# Patient Record
Sex: FEMALE | Race: WHITE | NOT HISPANIC OR LATINO | Employment: FULL TIME | URBAN - METROPOLITAN AREA
[De-identification: names, ages, dates, MRNs, and addresses within clinical notes are randomized per-mention and may not be internally consistent; named-entity substitution may affect disease eponyms.]

---

## 2019-02-13 LAB — BCS RECOMMENDATION EXT: NORMAL

## 2019-04-18 ENCOUNTER — OFFICE VISIT (OUTPATIENT)
Dept: OBSTETRICS AND GYNECOLOGY | Facility: CLINIC | Age: 59
End: 2019-04-18
Payer: COMMERCIAL

## 2019-04-18 VITALS
SYSTOLIC BLOOD PRESSURE: 124 MMHG | BODY MASS INDEX: 30.83 KG/M2 | DIASTOLIC BLOOD PRESSURE: 78 MMHG | HEIGHT: 62 IN | WEIGHT: 167.56 LBS

## 2019-04-18 DIAGNOSIS — D25.9 UTERINE LEIOMYOMA, UNSPECIFIED LOCATION: Primary | ICD-10-CM

## 2019-04-18 DIAGNOSIS — I10 HYPERTENSION, UNSPECIFIED TYPE: ICD-10-CM

## 2019-04-18 PROCEDURE — 3078F DIAST BP <80 MM HG: CPT | Mod: CPTII,S$GLB,, | Performed by: OBSTETRICS & GYNECOLOGY

## 2019-04-18 PROCEDURE — 3074F SYST BP LT 130 MM HG: CPT | Mod: CPTII,S$GLB,, | Performed by: OBSTETRICS & GYNECOLOGY

## 2019-04-18 PROCEDURE — 3074F PR MOST RECENT SYSTOLIC BLOOD PRESSURE < 130 MM HG: ICD-10-PCS | Mod: CPTII,S$GLB,, | Performed by: OBSTETRICS & GYNECOLOGY

## 2019-04-18 PROCEDURE — 3008F BODY MASS INDEX DOCD: CPT | Mod: CPTII,S$GLB,, | Performed by: OBSTETRICS & GYNECOLOGY

## 2019-04-18 PROCEDURE — 99999 PR PBB SHADOW E&M-NEW PATIENT-LVL III: ICD-10-PCS | Mod: PBBFAC,,, | Performed by: OBSTETRICS & GYNECOLOGY

## 2019-04-18 PROCEDURE — 99203 PR OFFICE/OUTPT VISIT, NEW, LEVL III, 30-44 MIN: ICD-10-PCS | Mod: S$GLB,,, | Performed by: OBSTETRICS & GYNECOLOGY

## 2019-04-18 PROCEDURE — 3078F PR MOST RECENT DIASTOLIC BLOOD PRESSURE < 80 MM HG: ICD-10-PCS | Mod: CPTII,S$GLB,, | Performed by: OBSTETRICS & GYNECOLOGY

## 2019-04-18 PROCEDURE — 3008F PR BODY MASS INDEX (BMI) DOCUMENTED: ICD-10-PCS | Mod: CPTII,S$GLB,, | Performed by: OBSTETRICS & GYNECOLOGY

## 2019-04-18 PROCEDURE — 99999 PR PBB SHADOW E&M-NEW PATIENT-LVL III: CPT | Mod: PBBFAC,,, | Performed by: OBSTETRICS & GYNECOLOGY

## 2019-04-18 PROCEDURE — 99203 OFFICE O/P NEW LOW 30 MIN: CPT | Mod: S$GLB,,, | Performed by: OBSTETRICS & GYNECOLOGY

## 2019-04-18 RX ORDER — PROGESTERONE 200 MG/1
CAPSULE ORAL
Refills: 1 | Status: ON HOLD | COMMUNITY
Start: 2019-04-08 | End: 2019-05-07

## 2019-04-18 NOTE — PROGRESS NOTES
Subjective:       Patient ID: Ivana Masters is a 58 y.o. female.    Chief Complaint:  Vaginal Bleeding (Patient reports vaginal bleeding started after Pellet insertion 2019. Patient is currently taking Prometrium 200mg.)    History of Present Illness:  HPI  59 y/o  presents for evaluation of PMB and uterine fibroids. She is menopausal for the past 2 years. She had endometrial ablation about 12 years ago which did not decrease her bleeding after the first month. Prior to ablation, cycles were heavy, lasting for 10 days. She is seen at [facility] for GYN care (Sury Cardona, [place]). She had pellets placed 2/15/19 for HRT but started having vaginal bleeding so was started on prometrium 200 mg daily. Recent pap and MMG normal in 2019. She brings her report from [facility] of a recent pelvic U/S. She is interested in definitive surgical management with hysterectomy.    4/15/19  Transvaginal U/S    Findings: The uterus appears to be anteverted with the uterus being enlarged measuring 11.8 x 6.9 x 8.6 cm. Enlargement is due to multiple hypoechoic solid masses most consistent with benign uterine fibroids with 3 moderated sized approximately 4.5 to 5 cm separate lesions seen. There are two anterior fibroids measuring 4.7 cm log axis with transmural involvement including submucosal involvement and there is a left-sided posterior fibroid measuring approximately 5.1 cm long axis transmural. Suspected benign uterine fibroids present demonstrate heterogeneous increased echogenicity and there is also some shadowing from the posterior lesion consistent with calcification and with changes consistent with fibroid degeneration. There is distortion of uterine anatomy with somewhat limited endometrial stripe visualization and with no definite pathological endometrial stripe thickening or focal lesion detected and with the endometrium measuring approximately 8 mm in thickness. Both ovaries appear within normal limits. No  pelvic free fluid was seen. No adnexal tenderness was reported. The bladder was not evaluated.    GYN & OB History  Patient's last menstrual period was 2014.   Date of Last Pap: No result found    OB History    Para Term  AB Living   2 2           SAB TAB Ectopic Multiple Live Births                  # Outcome Date GA Lbr Nir/2nd Weight Sex Delivery Anes PTL Lv   2 Para      Vag-Spont      1 Para      Vag-Spont          Review of Systems  Review of Systems   Constitutional: Negative for chills, diaphoresis, fatigue and fever.   Respiratory: Negative for cough and shortness of breath.    Cardiovascular: Negative for chest pain and palpitations.   Gastrointestinal: Negative for abdominal pain, constipation, diarrhea, nausea and vomiting.   Endocrine: Negative for hot flashes, hyperthyroidism and hypothyroidism.   Genitourinary: Positive for menorrhagia, menstrual problem and vaginal bleeding. Negative for dysmenorrhea, dyspareunia, dysuria, frequency, pelvic pain, vaginal discharge and vaginal pain.   Musculoskeletal: Negative for back pain and myalgias.   Integumentary:  Negative for rash and acne.   Neurological: Negative for headaches.   Psychiatric/Behavioral: Negative for depression. The patient is not nervous/anxious.            Objective:    Physical Exam:   Constitutional: She is oriented to person, place, and time. She appears well-developed and well-nourished. No distress.    HENT:   Head: Normocephalic and atraumatic.    Eyes: EOM are normal.    Neck: Normal range of motion.     Pulmonary/Chest: Effort normal.          Genitourinary:   Genitourinary Comments: Defer to next visit.           Musculoskeletal: Normal range of motion and moves all extremeties.       Neurological: She is alert and oriented to person, place, and time.    Skin: Skin is warm. No rash noted.    Psychiatric: She has a normal mood and affect. Her behavior is normal. Judgment and thought content normal.           Assessment:        1. Uterine leiomyoma, unspecified location    2. Hypertension, unspecified type             Plan:      Ivana was seen today for vaginal bleeding.    Diagnoses and all orders for this visit:    Uterine leiomyoma, unspecified location  - Reviewed U/S report noting 11 cm uterus with 3 distinct 4.5-5 cm fibroids.   - Counseled on need for tissue specimen given AUB. Will schedule EMBx next week. May be insufficient given history of endometrial ablation.  - Patient desires definitive surgical management with hysterectomy. If EMBx benign, will proceed with scheduling.   - Continue prometrium for now.   - Records requested from her GYN office. Per patient she has had some recent labs including thyroid testing.   - Referral placed for PCP establishment as she has only seen GYN in recent years. History of HTN but controlled after weight loss.    Orders Placed This Encounter   Procedures    Ambulatory referral to Family Practice       Follow up in about 1 week (around 4/25/2019) for EMBx.

## 2019-04-22 ENCOUNTER — PROCEDURE VISIT (OUTPATIENT)
Dept: OBSTETRICS AND GYNECOLOGY | Facility: CLINIC | Age: 59
End: 2019-04-22
Payer: COMMERCIAL

## 2019-04-22 VITALS — HEIGHT: 62 IN | BODY MASS INDEX: 30.69 KG/M2 | WEIGHT: 166.75 LBS

## 2019-04-22 DIAGNOSIS — N95.0 POSTMENOPAUSAL BLEEDING: Primary | ICD-10-CM

## 2019-04-22 PROCEDURE — 58100 BIOPSY OF UTERUS LINING: CPT | Mod: S$GLB,,, | Performed by: OBSTETRICS & GYNECOLOGY

## 2019-04-22 PROCEDURE — 88305 TISSUE EXAM BY PATHOLOGIST: CPT | Mod: 26,,, | Performed by: PATHOLOGY

## 2019-04-22 PROCEDURE — 88305 TISSUE EXAM BY PATHOLOGIST: CPT | Performed by: PATHOLOGY

## 2019-04-22 PROCEDURE — 58100 BIOPSY (GYNECOLOGICAL): ICD-10-PCS | Mod: S$GLB,,, | Performed by: OBSTETRICS & GYNECOLOGY

## 2019-04-22 PROCEDURE — 88305 TISSUE SPECIMEN TO PATHOLOGY, OBSTETRICS/GYNECOLOGY: ICD-10-PCS | Mod: 26,,, | Performed by: PATHOLOGY

## 2019-04-29 ENCOUNTER — OFFICE VISIT (OUTPATIENT)
Dept: PRIMARY CARE CLINIC | Facility: CLINIC | Age: 59
End: 2019-04-29
Payer: COMMERCIAL

## 2019-04-29 VITALS
RESPIRATION RATE: 16 BRPM | HEIGHT: 62 IN | OXYGEN SATURATION: 99 % | HEART RATE: 55 BPM | SYSTOLIC BLOOD PRESSURE: 126 MMHG | WEIGHT: 169 LBS | BODY MASS INDEX: 31.1 KG/M2 | DIASTOLIC BLOOD PRESSURE: 78 MMHG | TEMPERATURE: 98 F

## 2019-04-29 DIAGNOSIS — Z13.6 ENCOUNTER FOR SCREENING FOR CARDIOVASCULAR DISORDERS: ICD-10-CM

## 2019-04-29 DIAGNOSIS — Z12.11 COLON CANCER SCREENING: ICD-10-CM

## 2019-04-29 DIAGNOSIS — N93.8 DUB (DYSFUNCTIONAL UTERINE BLEEDING): Primary | ICD-10-CM

## 2019-04-29 DIAGNOSIS — Z01.818 PREOPERATIVE EXAMINATION: ICD-10-CM

## 2019-04-29 DIAGNOSIS — R73.9 HYPERGLYCEMIA: ICD-10-CM

## 2019-04-29 PROCEDURE — 93010 EKG 12-LEAD: ICD-10-PCS | Mod: S$GLB,,, | Performed by: INTERNAL MEDICINE

## 2019-04-29 PROCEDURE — 99204 OFFICE O/P NEW MOD 45 MIN: CPT | Mod: S$GLB,,, | Performed by: FAMILY MEDICINE

## 2019-04-29 PROCEDURE — 3008F PR BODY MASS INDEX (BMI) DOCUMENTED: ICD-10-PCS | Mod: CPTII,S$GLB,, | Performed by: FAMILY MEDICINE

## 2019-04-29 PROCEDURE — 93005 EKG 12-LEAD: ICD-10-PCS | Mod: S$GLB,,, | Performed by: FAMILY MEDICINE

## 2019-04-29 PROCEDURE — 99999 PR PBB SHADOW E&M-EST. PATIENT-LVL IV: CPT | Mod: PBBFAC,,, | Performed by: FAMILY MEDICINE

## 2019-04-29 PROCEDURE — 3074F SYST BP LT 130 MM HG: CPT | Mod: CPTII,S$GLB,, | Performed by: FAMILY MEDICINE

## 2019-04-29 PROCEDURE — 93005 ELECTROCARDIOGRAM TRACING: CPT | Mod: S$GLB,,, | Performed by: FAMILY MEDICINE

## 2019-04-29 PROCEDURE — 99999 PR PBB SHADOW E&M-EST. PATIENT-LVL IV: ICD-10-PCS | Mod: PBBFAC,,, | Performed by: FAMILY MEDICINE

## 2019-04-29 PROCEDURE — 3078F PR MOST RECENT DIASTOLIC BLOOD PRESSURE < 80 MM HG: ICD-10-PCS | Mod: CPTII,S$GLB,, | Performed by: FAMILY MEDICINE

## 2019-04-29 PROCEDURE — 3008F BODY MASS INDEX DOCD: CPT | Mod: CPTII,S$GLB,, | Performed by: FAMILY MEDICINE

## 2019-04-29 PROCEDURE — 3078F DIAST BP <80 MM HG: CPT | Mod: CPTII,S$GLB,, | Performed by: FAMILY MEDICINE

## 2019-04-29 PROCEDURE — 3074F PR MOST RECENT SYSTOLIC BLOOD PRESSURE < 130 MM HG: ICD-10-PCS | Mod: CPTII,S$GLB,, | Performed by: FAMILY MEDICINE

## 2019-04-29 PROCEDURE — 99204 PR OFFICE/OUTPT VISIT, NEW, LEVL IV, 45-59 MIN: ICD-10-PCS | Mod: S$GLB,,, | Performed by: FAMILY MEDICINE

## 2019-04-29 PROCEDURE — 93010 ELECTROCARDIOGRAM REPORT: CPT | Mod: S$GLB,,, | Performed by: INTERNAL MEDICINE

## 2019-04-29 NOTE — PROGRESS NOTES
"Subjective:       Patient ID: Ivana Masters is a 58 y.o. female.    Chief Complaint: Establish Care and Pre-op Exam (Patient is having a hysterectomy soon but needs to be cleared prior )    Has been having issues with significant postmenopausal uterine bleeding, recent biopsy negative.  Being scheduled for hysterectomy, needs surgical clearance.  No previous surgical complications.  No recent illness or injury other than gyn issues.  Has been told in the past that her blood sugar was slightly elevated, has tried to decrease her carbohydrate intake as a result of this.    Review of Systems   Constitutional: Negative for activity change and unexpected weight change.   HENT: Negative for hearing loss, rhinorrhea and trouble swallowing.    Eyes: Negative for discharge and visual disturbance.   Respiratory: Negative for chest tightness and wheezing.    Cardiovascular: Negative for chest pain and palpitations.   Gastrointestinal: Positive for constipation. Negative for abdominal pain, blood in stool, diarrhea and vomiting.   Endocrine: Negative for polydipsia and polyuria.   Genitourinary: Positive for menstrual problem. Negative for difficulty urinating, dysuria and hematuria.   Musculoskeletal: Positive for arthralgias and neck pain. Negative for joint swelling.   Skin: Negative for pallor, rash and wound.   Allergic/Immunologic: Negative for immunocompromised state.   Neurological: Negative for weakness and headaches.   Hematological: Does not bruise/bleed easily.   Psychiatric/Behavioral: Negative for confusion and dysphoric mood.       Objective:      Vitals:    04/29/19 1201   BP: 126/78   BP Location: Left arm   Patient Position: Sitting   BP Method: Large (Automatic)   Pulse: (!) 55   Resp: 16   Temp: 97.9 °F (36.6 °C)   TempSrc: Oral   SpO2: 99%   Weight: 76.7 kg (169 lb)   Height: 5' 2" (1.575 m)     Lab Results   Component Value Date    WBC 8.30 04/29/2019    HGB 13.0 04/29/2019    HCT 39.2 04/29/2019    PLT " 222 04/29/2019    CHOL 285 (H) 04/29/2019    TRIG 77 04/29/2019    HDL 71 04/29/2019    ALT 27 04/29/2019    AST 25 04/29/2019     04/29/2019    K 3.8 04/29/2019     04/29/2019    CREATININE 0.5 04/29/2019    BUN 13 04/29/2019    CO2 25 04/29/2019    TSH 1.79 04/29/2019     Physical Exam   Constitutional: She is oriented to person, place, and time. She appears well-developed and well-nourished.   HENT:   Head: Normocephalic and atraumatic.   Eyes: Pupils are equal, round, and reactive to light. EOM are normal.   Neck: Neck supple. No JVD present. Carotid bruit is not present.   Cardiovascular: Normal rate, regular rhythm and normal heart sounds.   Pulses:       Radial pulses are 2+ on the right side, and 2+ on the left side.   Pulmonary/Chest: Effort normal and breath sounds normal.   Abdominal: Soft. Bowel sounds are normal. She exhibits no distension. There is no tenderness.   Musculoskeletal: She exhibits no edema.   Neurological: She is alert and oriented to person, place, and time.   Skin: Skin is warm and dry.   Psychiatric: She has a normal mood and affect. Her behavior is normal.   Nursing note and vitals reviewed.      Assessment:       1. DUB (dysfunctional uterine bleeding)    2. Hyperglycemia    3. Preoperative examination    4. Encounter for screening for cardiovascular disorders    5. Colon cancer screening        Plan:       DUB (dysfunctional uterine bleeding)  -     TSH; Future; Expected date: 04/29/2019    Hyperglycemia  -     Comprehensive metabolic panel; Future; Expected date: 04/29/2019  -     Hemoglobin A1c; Future; Expected date: 04/29/2019  Low carb diet, exercise  Preoperative examination  -     EKG 12-lead  -     CBC auto differential; Future; Expected date: 04/29/2019  Cleared for surgery under general anesthesia  Encounter for screening for cardiovascular disorders  -     EKG 12-lead  -     CBC auto differential; Future; Expected date: 04/29/2019  -     Comprehensive  metabolic panel; Future; Expected date: 04/29/2019  -     Lipid panel; Future; Expected date: 04/29/2019    Colon cancer screening  -     Ambulatory referral to Colorectal Surgery      Medication List with Changes/Refills   Current Medications    PROGESTERONE (PROMETRIUM) 200 MG CAPSULE    TK ONE C PO  QHS

## 2019-04-30 ENCOUNTER — PATIENT MESSAGE (OUTPATIENT)
Dept: OBSTETRICS AND GYNECOLOGY | Facility: CLINIC | Age: 59
End: 2019-04-30

## 2019-04-30 ENCOUNTER — TELEPHONE (OUTPATIENT)
Dept: OBSTETRICS AND GYNECOLOGY | Facility: CLINIC | Age: 59
End: 2019-04-30

## 2019-04-30 DIAGNOSIS — N95.0 POSTMENOPAUSAL BLEEDING: Primary | ICD-10-CM

## 2019-05-02 ENCOUNTER — ANESTHESIA EVENT (OUTPATIENT)
Dept: SURGERY | Facility: OTHER | Age: 59
End: 2019-05-02
Payer: COMMERCIAL

## 2019-05-02 ENCOUNTER — HOSPITAL ENCOUNTER (OUTPATIENT)
Dept: PREADMISSION TESTING | Facility: OTHER | Age: 59
Discharge: HOME | End: 2019-05-02
Attending: OBSTETRICS & GYNECOLOGY
Payer: COMMERCIAL

## 2019-05-02 ENCOUNTER — OFFICE VISIT (OUTPATIENT)
Dept: OBSTETRICS AND GYNECOLOGY | Facility: CLINIC | Age: 59
End: 2019-05-02
Payer: COMMERCIAL

## 2019-05-02 VITALS
RESPIRATION RATE: 16 BRPM | HEART RATE: 75 BPM | DIASTOLIC BLOOD PRESSURE: 80 MMHG | WEIGHT: 166 LBS | BODY MASS INDEX: 30.55 KG/M2 | SYSTOLIC BLOOD PRESSURE: 125 MMHG | HEIGHT: 62 IN | TEMPERATURE: 98 F | OXYGEN SATURATION: 97 %

## 2019-05-02 DIAGNOSIS — N95.0 POSTMENOPAUSAL BLEEDING: Primary | ICD-10-CM

## 2019-05-02 DIAGNOSIS — N93.8 DUB (DYSFUNCTIONAL UTERINE BLEEDING): Primary | ICD-10-CM

## 2019-05-02 LAB
ABO + RH BLD: NORMAL
BLD GP AB SCN CELLS X3 SERPL QL: NORMAL

## 2019-05-02 PROCEDURE — 99999 PR PBB SHADOW E&M-EST. PATIENT-LVL II: ICD-10-PCS | Mod: PBBFAC,,, | Performed by: OBSTETRICS & GYNECOLOGY

## 2019-05-02 PROCEDURE — 36415 COLL VENOUS BLD VENIPUNCTURE: CPT

## 2019-05-02 PROCEDURE — 99499 UNLISTED E&M SERVICE: CPT | Mod: S$GLB,,, | Performed by: OBSTETRICS & GYNECOLOGY

## 2019-05-02 PROCEDURE — 99499 NO LOS: ICD-10-PCS | Mod: S$GLB,,, | Performed by: OBSTETRICS & GYNECOLOGY

## 2019-05-02 PROCEDURE — 86901 BLOOD TYPING SEROLOGIC RH(D): CPT

## 2019-05-02 PROCEDURE — 99999 PR PBB SHADOW E&M-EST. PATIENT-LVL II: CPT | Mod: PBBFAC,,, | Performed by: OBSTETRICS & GYNECOLOGY

## 2019-05-02 RX ORDER — LIDOCAINE HYDROCHLORIDE 10 MG/ML
0.5 INJECTION, SOLUTION EPIDURAL; INFILTRATION; INTRACAUDAL; PERINEURAL ONCE
Status: CANCELLED | OUTPATIENT
Start: 2019-05-02 | End: 2019-05-02

## 2019-05-02 RX ORDER — SODIUM CHLORIDE, SODIUM LACTATE, POTASSIUM CHLORIDE, CALCIUM CHLORIDE 600; 310; 30; 20 MG/100ML; MG/100ML; MG/100ML; MG/100ML
INJECTION, SOLUTION INTRAVENOUS CONTINUOUS
Status: CANCELLED | OUTPATIENT
Start: 2019-05-02

## 2019-05-02 RX ORDER — OMEPRAZOLE 10 MG/1
10 CAPSULE, DELAYED RELEASE ORAL DAILY
COMMUNITY

## 2019-05-02 RX ORDER — ACETAMINOPHEN 500 MG
1000 TABLET ORAL
Status: CANCELLED | OUTPATIENT
Start: 2019-05-02 | End: 2019-05-02

## 2019-05-02 RX ORDER — FAMOTIDINE 20 MG/1
20 TABLET, FILM COATED ORAL
Status: CANCELLED | OUTPATIENT
Start: 2019-05-02 | End: 2019-05-02

## 2019-05-02 RX ORDER — PREGABALIN 75 MG/1
150 CAPSULE ORAL ONCE
Status: CANCELLED | OUTPATIENT
Start: 2019-05-02 | End: 2019-05-02

## 2019-05-02 NOTE — ANESTHESIA PREPROCEDURE EVALUATION
05/02/2019  Ivana Masters is a 58 y.o., female.    Anesthesia Evaluation    I have reviewed the Patient Summary Reports.    I have reviewed the Nursing Notes.   I have reviewed the Medications.     Review of Systems  Anesthesia Hx:  Denies Family Hx of Anesthesia complications.   Denies Personal Hx of Anesthesia complications.   Social:  Non-Smoker    Hematology/Oncology:  Hematology Normal   Oncology Normal    -- Denies Anemia (hct 39):    EENT/Dental:EENT/Dental Normal   Cardiovascular:   Hypertension    Pulmonary:  Pulmonary Normal    Renal/:  Renal/ Normal     Hepatic/GI:   GERD (OTC rx), well controlled Gastric sleeve   Musculoskeletal:  Musculoskeletal Normal    Neurological:  Neurology Normal    Endocrine:  Endocrine Normal    Dermatological:  Skin Normal    Psych:  Psychiatric Normal           Physical Exam  General:  Well nourished    Airway/Jaw/Neck:  Airway Findings: Mouth Opening: Normal Mallampati: II  TM Distance: Normal, at least 6 cm  Jaw/Neck Findings:  Neck ROM: Normal ROM      Dental:  Dental Findings: In tact, Molar caps        Mental Status:  Mental Status Findings:  Cooperative, Alert and Oriented         Anesthesia Plan  Type of Anesthesia, risks & benefits discussed:  Anesthesia Type:  general  Patient's Preference:   Intra-op Monitoring Plan: standard ASA monitors  Intra-op Monitoring Plan Comments:   Post Op Pain Control Plan: multimodal analgesia  Post Op Pain Control Plan Comments:   Induction:   IV  Beta Blocker:         Informed Consent: Patient understands risks and agrees with Anesthesia plan.  Questions answered. Anesthesia consent signed with patient.  ASA Score: 2     Day of Surgery Review of History & Physical:    H&P update referred to the surgeon.     Anesthesia Plan Notes: Recent labs/EKG/clearance in epic, reviewed, OK, T&S today        Ready For Surgery From  Anesthesia Perspective.

## 2019-05-02 NOTE — PROGRESS NOTES
History & Physical  Gynecology      SUBJECTIVE:     Chief Complaint: Vaginal Bleeding     History of Present Illness:  57 y/o  presents for surgery workup for scheduled TLH/BSO on 19 for postmenopausal bleeding. EMBx negative. She is menopausal for the past 2 years. She had endometrial ablation about 12 years ago which did not decrease her bleeding after the first month. Prior to ablation, cycles were heavy, lasting for 10 days. She is seen at [facility] for GYN care (Sury Cardona NP). She had pellets placed 2/15/19 for HRT but started having vaginal bleeding so was started on prometrium 200 mg daily. Recent pap and MMG normal in 2019. She brings her report from [facility] of a recent pelvic U/S. She is interested in definitive surgical management with hysterectomy.     4/15/19  Transvaginal U/S     Findings: The uterus appears to be anteverted with the uterus being enlarged measuring 11.8 x 6.9 x 8.6 cm. Enlargement is due to multiple hypoechoic solid masses most consistent with benign uterine fibroids with 3 moderated sized approximately 4.5 to 5 cm separate lesions seen. There are two anterior fibroids measuring 4.7 cm log axis with transmural involvement including submucosal involvement and there is a left-sided posterior fibroid measuring approximately 5.1 cm long axis transmural. Suspected benign uterine fibroids present demonstrate heterogeneous increased echogenicity and there is also some shadowing from the posterior lesion consistent with calcification and with changes consistent with fibroid degeneration. There is distortion of uterine anatomy with somewhat limited endometrial stripe visualization and with no definite pathological endometrial stripe thickening or focal lesion detected and with the endometrium measuring approximately 8 mm in thickness. Both ovaries appear within normal limits. No pelvic free fluid was seen. No adnexal tenderness was reported. The bladder was not  evaluated.    Review of patient's allergies indicates:  No Known Allergies    Past Medical History:   Diagnosis Date    GERD (gastroesophageal reflux disease)     Hypertension     diet controlled     Past Surgical History:   Procedure Laterality Date    ENDOMETRIAL ABLATION  2007    gastric sleeve      [facility]/2 [facility]    HYSTEROSCOPY  2007    SLEEVE GASTROPLASTY       OB History        2    Para   2    Term                AB        Living           SAB        TAB        Ectopic        Multiple        Live Births                   Family History   Problem Relation Age of Onset    Cancer Mother         breast    Hypertension Father     Diabetes Father     Cancer Father         liver     Social History     Tobacco Use    Smoking status: Never Smoker    Smokeless tobacco: Never Used   Substance Use Topics    Alcohol use: Never     Alcohol/week: 0.6 oz     Types: 1 Glasses of wine per week     Frequency: Never    Drug use: No       Current Outpatient Medications   Medication Sig    omeprazole (PRILOSEC) 10 MG capsule Take 10 mg by mouth once daily.    progesterone (PROMETRIUM) 200 MG capsule TK ONE C PO  QHS     No current facility-administered medications for this visit.          Review of Systems:  Review of Systems   Constitutional: Negative for chills, diaphoresis, fatigue and fever.   Respiratory: Negative for cough and shortness of breath.    Cardiovascular: Negative for chest pain and palpitations.   Gastrointestinal: Negative for abdominal pain, constipation, diarrhea, nausea and vomiting.   Genitourinary: Positive for vaginal bleeding and postmenopausal bleeding. Negative for dysmenorrhea, dyspareunia, dysuria, frequency, hematuria, menstrual problem, pelvic pain, vaginal discharge and vaginal pain.   Musculoskeletal: Negative for back pain and myalgias.   Integumentary:  Negative for rash and acne.   Neurological: Negative for headaches.   Psychiatric/Behavioral:  Negative for depression. The patient is not nervous/anxious.         OBJECTIVE:     Physical Exam:  Physical Exam   Constitutional: She is oriented to person, place, and time. She appears well-developed and well-nourished. No distress.   HENT:   Head: Normocephalic and atraumatic.   Eyes: EOM are normal.   Neck: Normal range of motion.   Cardiovascular: Normal rate, regular rhythm and normal heart sounds. Exam reveals no gallop and no friction rub.   No murmur heard.  Pulmonary/Chest: Effort normal. No respiratory distress. She has no wheezes. She has no rales.   Abdominal: Soft. She exhibits no distension and no mass. There is no tenderness. There is no rebound and no guarding. No hernia.   Genitourinary: No vaginal discharge found.   Genitourinary Comments: See prior notes.   Musculoskeletal: Normal range of motion. She exhibits no edema.   Neurological: She is alert and oriented to person, place, and time.   Skin: Skin is warm. No rash noted.   Psychiatric: She has a normal mood and affect. Her behavior is normal. Judgment and thought content normal.         ASSESSMENT:       ICD-10-CM ICD-9-CM    1. Postmenopausal bleeding N95.0 627.1           Plan:      Ivana was seen today for vaginal bleeding.    Diagnoses and all orders for this visit:    Postmenopausal bleeding  - TLH/BSO scheduled for 5/7/19.   - EMBx neg.   - Consents signed. Risks, benefits, alternatives discussed.  - Preop done.   - All questions answered.     No orders of the defined types were placed in this encounter.      Follow up in about 6 weeks (around 6/13/2019) for post op.     Counseling time: 15 minutes    Stella Shaffer

## 2019-05-02 NOTE — DISCHARGE INSTRUCTIONS
PRE-ADMIT TESTING -  [phone]    [address]  [place] [facility]          Your surgery has been scheduled at [facility]. We are pleased to have the opportunity to serve you. For Further Information please call [phone].    On the day of surgery please report to the Information Desk on the 1st floor.    · CONTACT YOUR PHYSICIAN'S OFFICE THE DAY PRIOR TO YOUR SURGERY TO OBTAIN YOUR ARRIVAL TIME.     · The evening before surgery do not eat anything after 9 p.m. ( this includes hard candy, chewing gum and mints).  You may only have GATORADE, POWERADE AND WATER  from 9 p.m. until you leave your home.   DO NOT DRINK ANY LIQUIDS ON THE WAY TO THE HOSPITAL.      SPECIAL MEDICATION INSTRUCTIONS: TAKE medications checked off by the Anesthesiologist on your Medication List.    Angiogram Patients: Take medications as instructed by your physician, including aspirin.     Surgery Patients:    If you take ASPIRIN - Your PHYSICIAN/SURGEON will need to inform you IF/OR when you need to stop taking aspirin prior to your surgery.     Do Not take any medications containing IBUPROFEN.  Do Not Wear any make-up or dark nail polish   (especially eye make-up) to surgery. If you come to surgery with makeup on you will be required to remove the makeup or nail polish.    Do not shave your surgical area at least 5 days prior to your surgery. The surgical prep will be performed at the hospital according to Infection Control regulations.    Leave all valuables at home.   Do Not wear any jewelry or watches, including any metal in body piercings. Jewelry must be removed prior to coming to the hospital.  There is a possibility that rings that are unable to be removed may be cut off if they are on the surgical extremity.    Contact Lens must be removed before surgery. Either do not wear the contact lens or bring a case and solution for storage.  Please bring a container for eyeglasses or dentures as required.  Bring  any paperwork your physician has provided, such as consent forms,  history and physicals, doctor's orders, etc.   Bring comfortable clothes that are loose fitting to wear upon discharge. Take into consideration the type of surgery being performed.  Maintain your diet as advised per your physician the day prior to surgery.      Adequate rest the night before surgery is advised.   Park in the Parking lot behind the hospital or in the [facility] across the street from the parking lot. Parking is complimentary.  If you will be discharged the same day as your procedure, please arrange for a responsible adult to drive you home or to accompany you if traveling by taxi.   YOU WILL NOT BE PERMITTED TO DRIVE OR TO LEAVE THE HOSPITAL ALONE AFTER SURGERY.   It is strongly recommended that you arrange for someone to remain with you for the first 24 hrs following your surgery.       Thank you for your cooperation.  The Staff of [facility].                Bathing Instructions with Hibiclens     Shower the evening before and morning of your procedure with Hibiclens:   Wash your face with water and your regular face wash/soap   Apply Hibiclens directly on your skin or on a wet washcloth and wash gently. When showering: Move away from the shower stream when applying Hibiclens to avoid rinsing off too soon.   Rinse thoroughly with warm water   Do not dilute Hibiclens         Dry off as usual, do not use any deodorant, powder, body lotions, perfume, after shave or cologne.

## 2019-05-02 NOTE — H&P (VIEW-ONLY)
History & Physical  Gynecology      SUBJECTIVE:     Chief Complaint: Vaginal Bleeding     History of Present Illness:  57 y/o  presents for surgery workup for scheduled TLH/BSO on 19 for postmenopausal bleeding. EMBx negative. She is menopausal for the past 2 years. She had endometrial ablation about 12 years ago which did not decrease her bleeding after the first month. Prior to ablation, cycles were heavy, lasting for 10 days. She is seen at [facility] for GYN care (Sury Cardona NP). She had pellets placed 2/15/19 for HRT but started having vaginal bleeding so was started on prometrium 200 mg daily. Recent pap and MMG normal in 2019. She brings her report from [facility] of a recent pelvic U/S. She is interested in definitive surgical management with hysterectomy.     4/15/19  Transvaginal U/S     Findings: The uterus appears to be anteverted with the uterus being enlarged measuring 11.8 x 6.9 x 8.6 cm. Enlargement is due to multiple hypoechoic solid masses most consistent with benign uterine fibroids with 3 moderated sized approximately 4.5 to 5 cm separate lesions seen. There are two anterior fibroids measuring 4.7 cm log axis with transmural involvement including submucosal involvement and there is a left-sided posterior fibroid measuring approximately 5.1 cm long axis transmural. Suspected benign uterine fibroids present demonstrate heterogeneous increased echogenicity and there is also some shadowing from the posterior lesion consistent with calcification and with changes consistent with fibroid degeneration. There is distortion of uterine anatomy with somewhat limited endometrial stripe visualization and with no definite pathological endometrial stripe thickening or focal lesion detected and with the endometrium measuring approximately 8 mm in thickness. Both ovaries appear within normal limits. No pelvic free fluid was seen. No adnexal tenderness was reported. The bladder was not  evaluated.    Review of patient's allergies indicates:  No Known Allergies    Past Medical History:   Diagnosis Date    GERD (gastroesophageal reflux disease)     Hypertension     diet controlled     Past Surgical History:   Procedure Laterality Date    ENDOMETRIAL ABLATION  2007    gastric sleeve      1 1/2 year ago  in [facility]    HYSTEROSCOPY  2007    SLEEVE GASTROPLASTY       OB History        2    Para   2    Term                AB        Living           SAB        TAB        Ectopic        Multiple        Live Births                   Family History   Problem Relation Age of Onset    Cancer Mother         breast    Hypertension Father     Diabetes Father     Cancer Father         liver     Social History     Tobacco Use    Smoking status: Never Smoker    Smokeless tobacco: Never Used   Substance Use Topics    Alcohol use: Never     Alcohol/week: 0.6 oz     Types: 1 Glasses of wine per week     Frequency: Never    Drug use: No       Current Outpatient Medications   Medication Sig    omeprazole (PRILOSEC) 10 MG capsule Take 10 mg by mouth once daily.    progesterone (PROMETRIUM) 200 MG capsule TK ONE C PO  QHS     No current facility-administered medications for this visit.          Review of Systems:  Review of Systems   Constitutional: Negative for chills, diaphoresis, fatigue and fever.   Respiratory: Negative for cough and shortness of breath.    Cardiovascular: Negative for chest pain and palpitations.   Gastrointestinal: Negative for abdominal pain, constipation, diarrhea, nausea and vomiting.   Genitourinary: Positive for vaginal bleeding and postmenopausal bleeding. Negative for dysmenorrhea, dyspareunia, dysuria, frequency, hematuria, menstrual problem, pelvic pain, vaginal discharge and vaginal pain.   Musculoskeletal: Negative for back pain and myalgias.   Integumentary:  Negative for rash and acne.   Neurological: Negative for headaches.   Psychiatric/Behavioral:  Negative for depression. The patient is not nervous/anxious.         OBJECTIVE:     Physical Exam:  Physical Exam   Constitutional: She is oriented to person, place, and time. She appears well-developed and well-nourished. No distress.   HENT:   Head: Normocephalic and atraumatic.   Eyes: EOM are normal.   Neck: Normal range of motion.   Cardiovascular: Normal rate, regular rhythm and normal heart sounds. Exam reveals no gallop and no friction rub.   No murmur heard.  Pulmonary/Chest: Effort normal. No respiratory distress. She has no wheezes. She has no rales.   Abdominal: Soft. She exhibits no distension and no mass. There is no tenderness. There is no rebound and no guarding. No hernia.   Genitourinary: No vaginal discharge found.   Genitourinary Comments: See prior notes.   Musculoskeletal: Normal range of motion. She exhibits no edema.   Neurological: She is alert and oriented to person, place, and time.   Skin: Skin is warm. No rash noted.   Psychiatric: She has a normal mood and affect. Her behavior is normal. Judgment and thought content normal.         ASSESSMENT:       ICD-10-CM ICD-9-CM    1. Postmenopausal bleeding N95.0 627.1           Plan:      Ivana was seen today for vaginal bleeding.    Diagnoses and all orders for this visit:    Postmenopausal bleeding  - TLH/BSO scheduled for 5/7/19.   - EMBx neg.   - Consents signed. Risks, benefits, alternatives discussed.  - Preop done.   - All questions answered.     No orders of the defined types were placed in this encounter.      Follow up in about 6 weeks (around 6/13/2019) for post op.     Counseling time: 15 minutes    Stella Shaffer

## 2019-05-06 ENCOUNTER — TELEPHONE (OUTPATIENT)
Dept: SURGERY | Facility: CLINIC | Age: 59
End: 2019-05-06

## 2019-05-06 ENCOUNTER — PATIENT MESSAGE (OUTPATIENT)
Dept: OBSTETRICS AND GYNECOLOGY | Facility: CLINIC | Age: 59
End: 2019-05-06

## 2019-05-06 DIAGNOSIS — Z12.11 SCREENING FOR COLON CANCER: Primary | ICD-10-CM

## 2019-05-06 RX ORDER — SODIUM CHLORIDE 0.9 % (FLUSH) 0.9 %
3 SYRINGE (ML) INJECTION
Status: CANCELLED | OUTPATIENT
Start: 2019-05-06

## 2019-05-06 NOTE — TELEPHONE ENCOUNTER
Called patient in reference to referral for  colon cancer screening. Patient verbally consented to a Colonoscopy, and requested to be scheduled for the Colonoscopy on 07/12/2019. Patient was advised a designated . is required on the day of the Colonoscopy. The designated  must be at least 18 years old. The patient's medications profile on record was reviewed with the patient for accuracy of formation. The patient acknowledges the medication profile is accurate, and no other medications are being consumed by the patient at this time. Detailed Colonoscopy Prep instructions were explained to and discussed with the patient. The patient was advised the same detailed prep instructions discussed on the phone, will be mailed to the patient's address on file. The address on file was verified with the patient for accuracy of mailing. Patient was explained the Colonoscopy  will be done here at [facility]. Patient was advised the Pre-Op nurse will be in contact at least three days prior to the Colonoscopy to discuss Colonoscopy Pre-Op instructions. The patient was given the opportunity to ask any questions about the Colonoscopy. Patient acknowledges understanding of all instructions. No further issues were discussed.

## 2019-05-07 ENCOUNTER — HOSPITAL ENCOUNTER (OUTPATIENT)
Facility: OTHER | Age: 59
Discharge: HOME | End: 2019-05-08
Attending: OBSTETRICS & GYNECOLOGY | Admitting: OBSTETRICS & GYNECOLOGY
Payer: COMMERCIAL

## 2019-05-07 ENCOUNTER — ANESTHESIA (OUTPATIENT)
Dept: SURGERY | Facility: OTHER | Age: 59
End: 2019-05-07
Payer: COMMERCIAL

## 2019-05-07 DIAGNOSIS — Z90.79 S/P TOTAL HYSTERECTOMY AND BILATERAL SALPINGO-OOPHORECTOMY: Primary | ICD-10-CM

## 2019-05-07 DIAGNOSIS — Z90.722 S/P TOTAL HYSTERECTOMY AND BILATERAL SALPINGO-OOPHORECTOMY: Primary | ICD-10-CM

## 2019-05-07 DIAGNOSIS — N95.0 POSTMENOPAUSAL BLEEDING: ICD-10-CM

## 2019-05-07 DIAGNOSIS — Z90.710 S/P TOTAL HYSTERECTOMY AND BILATERAL SALPINGO-OOPHORECTOMY: Primary | ICD-10-CM

## 2019-05-07 LAB — POCT GLUCOSE: 103 MG/DL (ref 70–110)

## 2019-05-07 PROCEDURE — 58573 TLH W/T/O UTERUS OVER 250 G: CPT | Mod: 82,,, | Performed by: OBSTETRICS & GYNECOLOGY

## 2019-05-07 PROCEDURE — 37000008 HC ANESTHESIA 1ST 15 MINUTES: Performed by: OBSTETRICS & GYNECOLOGY

## 2019-05-07 PROCEDURE — 82962 GLUCOSE BLOOD TEST: CPT | Performed by: OBSTETRICS & GYNECOLOGY

## 2019-05-07 PROCEDURE — 58573 PR LAPAROSCOPY TOT HYSTERECTOMY UTERUS >250 GRAM W TUBE/OVARY: ICD-10-PCS | Mod: ,,, | Performed by: OBSTETRICS & GYNECOLOGY

## 2019-05-07 PROCEDURE — 25000003 PHARM REV CODE 250: Performed by: ANESTHESIOLOGY

## 2019-05-07 PROCEDURE — 63600175 PHARM REV CODE 636 W HCPCS: Performed by: NURSE ANESTHETIST, CERTIFIED REGISTERED

## 2019-05-07 PROCEDURE — 36000710: Performed by: OBSTETRICS & GYNECOLOGY

## 2019-05-07 PROCEDURE — 63600175 PHARM REV CODE 636 W HCPCS: Performed by: OBSTETRICS & GYNECOLOGY

## 2019-05-07 PROCEDURE — 37000009 HC ANESTHESIA EA ADD 15 MINS: Performed by: OBSTETRICS & GYNECOLOGY

## 2019-05-07 PROCEDURE — 58573 PR LAPAROSCOPY TOT HYSTERECTOMY UTERUS >250 GRAM W TUBE/OVARY: ICD-10-PCS | Mod: 82,,, | Performed by: OBSTETRICS & GYNECOLOGY

## 2019-05-07 PROCEDURE — 71000039 HC RECOVERY, EACH ADD'L HOUR: Performed by: OBSTETRICS & GYNECOLOGY

## 2019-05-07 PROCEDURE — 36000711: Performed by: OBSTETRICS & GYNECOLOGY

## 2019-05-07 PROCEDURE — 27201423 OPTIME MED/SURG SUP & DEVICES STERILE SUPPLY: Performed by: OBSTETRICS & GYNECOLOGY

## 2019-05-07 PROCEDURE — P9045 ALBUMIN (HUMAN), 5%, 250 ML: HCPCS | Mod: JG | Performed by: NURSE ANESTHETIST, CERTIFIED REGISTERED

## 2019-05-07 PROCEDURE — 25000003 PHARM REV CODE 250: Performed by: STUDENT IN AN ORGANIZED HEALTH CARE EDUCATION/TRAINING PROGRAM

## 2019-05-07 PROCEDURE — 25000003 PHARM REV CODE 250: Performed by: OBSTETRICS & GYNECOLOGY

## 2019-05-07 PROCEDURE — 63600175 PHARM REV CODE 636 W HCPCS: Performed by: ANESTHESIOLOGY

## 2019-05-07 PROCEDURE — 88307 TISSUE EXAM BY PATHOLOGIST: CPT | Performed by: PATHOLOGY

## 2019-05-07 PROCEDURE — 58573 TLH W/T/O UTERUS OVER 250 G: CPT | Mod: ,,, | Performed by: OBSTETRICS & GYNECOLOGY

## 2019-05-07 PROCEDURE — 25000003 PHARM REV CODE 250: Performed by: NURSE ANESTHETIST, CERTIFIED REGISTERED

## 2019-05-07 PROCEDURE — 88307 TISSUE SPECIMEN TO PATHOLOGY - SURGERY: ICD-10-PCS | Mod: 26,,, | Performed by: PATHOLOGY

## 2019-05-07 PROCEDURE — 71000033 HC RECOVERY, INTIAL HOUR: Performed by: OBSTETRICS & GYNECOLOGY

## 2019-05-07 PROCEDURE — 88307 TISSUE EXAM BY PATHOLOGIST: CPT | Mod: 26,,, | Performed by: PATHOLOGY

## 2019-05-07 RX ORDER — CEFAZOLIN SODIUM 1 G/3ML
2 INJECTION, POWDER, FOR SOLUTION INTRAMUSCULAR; INTRAVENOUS
Status: COMPLETED | OUTPATIENT
Start: 2019-05-07 | End: 2019-05-07

## 2019-05-07 RX ORDER — MEPERIDINE HYDROCHLORIDE 25 MG/ML
12.5 INJECTION INTRAMUSCULAR; INTRAVENOUS; SUBCUTANEOUS ONCE AS NEEDED
Status: DISCONTINUED | OUTPATIENT
Start: 2019-05-07 | End: 2019-05-07 | Stop reason: HOSPADM

## 2019-05-07 RX ORDER — GLYCOPYRROLATE 0.2 MG/ML
INJECTION INTRAMUSCULAR; INTRAVENOUS
Status: DISCONTINUED | OUTPATIENT
Start: 2019-05-07 | End: 2019-05-07

## 2019-05-07 RX ORDER — OXYCODONE AND ACETAMINOPHEN 10; 325 MG/1; MG/1
1 TABLET ORAL EVERY 4 HOURS PRN
Status: DISCONTINUED | OUTPATIENT
Start: 2019-05-07 | End: 2019-05-08 | Stop reason: HOSPADM

## 2019-05-07 RX ORDER — MIDAZOLAM HYDROCHLORIDE 1 MG/ML
INJECTION INTRAMUSCULAR; INTRAVENOUS
Status: DISCONTINUED | OUTPATIENT
Start: 2019-05-07 | End: 2019-05-07

## 2019-05-07 RX ORDER — PANTOPRAZOLE SODIUM 40 MG/1
40 TABLET, DELAYED RELEASE ORAL DAILY
Status: DISCONTINUED | OUTPATIENT
Start: 2019-05-08 | End: 2019-05-08 | Stop reason: HOSPADM

## 2019-05-07 RX ORDER — LIDOCAINE HYDROCHLORIDE 10 MG/ML
0.5 INJECTION, SOLUTION EPIDURAL; INFILTRATION; INTRACAUDAL; PERINEURAL ONCE
Status: DISCONTINUED | OUTPATIENT
Start: 2019-05-07 | End: 2019-05-07 | Stop reason: HOSPADM

## 2019-05-07 RX ORDER — SODIUM CHLORIDE, SODIUM LACTATE, POTASSIUM CHLORIDE, CALCIUM CHLORIDE 600; 310; 30; 20 MG/100ML; MG/100ML; MG/100ML; MG/100ML
INJECTION, SOLUTION INTRAVENOUS CONTINUOUS
Status: DISCONTINUED | OUTPATIENT
Start: 2019-05-07 | End: 2019-05-07

## 2019-05-07 RX ORDER — PROPOFOL 10 MG/ML
VIAL (ML) INTRAVENOUS
Status: DISCONTINUED | OUTPATIENT
Start: 2019-05-07 | End: 2019-05-07

## 2019-05-07 RX ORDER — ONDANSETRON 2 MG/ML
4 INJECTION INTRAMUSCULAR; INTRAVENOUS DAILY PRN
Status: DISCONTINUED | OUTPATIENT
Start: 2019-05-07 | End: 2019-05-07 | Stop reason: HOSPADM

## 2019-05-07 RX ORDER — BISACODYL 10 MG
10 SUPPOSITORY, RECTAL RECTAL DAILY PRN
Status: DISCONTINUED | OUTPATIENT
Start: 2019-05-07 | End: 2019-05-08 | Stop reason: HOSPADM

## 2019-05-07 RX ORDER — HYDROMORPHONE HYDROCHLORIDE 2 MG/ML
0.4 INJECTION, SOLUTION INTRAMUSCULAR; INTRAVENOUS; SUBCUTANEOUS EVERY 5 MIN PRN
Status: DISCONTINUED | OUTPATIENT
Start: 2019-05-07 | End: 2019-05-07 | Stop reason: HOSPADM

## 2019-05-07 RX ORDER — FAMOTIDINE 20 MG/1
20 TABLET, FILM COATED ORAL
Status: COMPLETED | OUTPATIENT
Start: 2019-05-07 | End: 2019-05-07

## 2019-05-07 RX ORDER — FENTANYL CITRATE 50 UG/ML
INJECTION, SOLUTION INTRAMUSCULAR; INTRAVENOUS
Status: DISCONTINUED | OUTPATIENT
Start: 2019-05-07 | End: 2019-05-07

## 2019-05-07 RX ORDER — ATROPINE SULFATE 0.4 MG/ML
INJECTION, SOLUTION ENDOTRACHEAL; INTRAMEDULLARY; INTRAMUSCULAR; INTRAVENOUS; SUBCUTANEOUS
Status: DISCONTINUED | OUTPATIENT
Start: 2019-05-07 | End: 2019-05-07

## 2019-05-07 RX ORDER — ALBUMIN HUMAN 50 G/1000ML
SOLUTION INTRAVENOUS CONTINUOUS PRN
Status: DISCONTINUED | OUTPATIENT
Start: 2019-05-07 | End: 2019-05-07

## 2019-05-07 RX ORDER — KETOROLAC TROMETHAMINE 30 MG/ML
30 INJECTION, SOLUTION INTRAMUSCULAR; INTRAVENOUS EVERY 6 HOURS
Status: DISCONTINUED | OUTPATIENT
Start: 2019-05-07 | End: 2019-05-08 | Stop reason: HOSPADM

## 2019-05-07 RX ORDER — KETOROLAC TROMETHAMINE 30 MG/ML
INJECTION, SOLUTION INTRAMUSCULAR; INTRAVENOUS
Status: DISCONTINUED | OUTPATIENT
Start: 2019-05-07 | End: 2019-05-07

## 2019-05-07 RX ORDER — ACETAMINOPHEN 500 MG
1000 TABLET ORAL
Status: COMPLETED | OUTPATIENT
Start: 2019-05-07 | End: 2019-05-07

## 2019-05-07 RX ORDER — HYDROMORPHONE HYDROCHLORIDE 1 MG/ML
1 INJECTION, SOLUTION INTRAMUSCULAR; INTRAVENOUS; SUBCUTANEOUS EVERY 4 HOURS PRN
Status: DISCONTINUED | OUTPATIENT
Start: 2019-05-07 | End: 2019-05-08 | Stop reason: HOSPADM

## 2019-05-07 RX ORDER — LIDOCAINE HCL/PF 100 MG/5ML
SYRINGE (ML) INTRAVENOUS
Status: DISCONTINUED | OUTPATIENT
Start: 2019-05-07 | End: 2019-05-07

## 2019-05-07 RX ORDER — SODIUM CHLORIDE, SODIUM LACTATE, POTASSIUM CHLORIDE, CALCIUM CHLORIDE 600; 310; 30; 20 MG/100ML; MG/100ML; MG/100ML; MG/100ML
INJECTION, SOLUTION INTRAVENOUS CONTINUOUS
Status: DISCONTINUED | OUTPATIENT
Start: 2019-05-07 | End: 2019-05-08

## 2019-05-07 RX ORDER — PREGABALIN 75 MG/1
150 CAPSULE ORAL ONCE
Status: COMPLETED | OUTPATIENT
Start: 2019-05-07 | End: 2019-05-07

## 2019-05-07 RX ORDER — ONDANSETRON 8 MG/1
8 TABLET, ORALLY DISINTEGRATING ORAL EVERY 8 HOURS PRN
Status: DISCONTINUED | OUTPATIENT
Start: 2019-05-07 | End: 2019-05-08 | Stop reason: HOSPADM

## 2019-05-07 RX ORDER — OXYCODONE AND ACETAMINOPHEN 5; 325 MG/1; MG/1
1 TABLET ORAL EVERY 4 HOURS PRN
Status: DISCONTINUED | OUTPATIENT
Start: 2019-05-07 | End: 2019-05-08 | Stop reason: HOSPADM

## 2019-05-07 RX ORDER — SODIUM CHLORIDE 0.9 % (FLUSH) 0.9 %
3 SYRINGE (ML) INJECTION
Status: DISCONTINUED | OUTPATIENT
Start: 2019-05-07 | End: 2019-05-08 | Stop reason: HOSPADM

## 2019-05-07 RX ORDER — DIPHENHYDRAMINE HCL 25 MG
25 CAPSULE ORAL EVERY 4 HOURS PRN
Status: DISCONTINUED | OUTPATIENT
Start: 2019-05-07 | End: 2019-05-08 | Stop reason: HOSPADM

## 2019-05-07 RX ORDER — OXYCODONE HYDROCHLORIDE 5 MG/1
5 TABLET ORAL
Status: DISCONTINUED | OUTPATIENT
Start: 2019-05-07 | End: 2019-05-07 | Stop reason: HOSPADM

## 2019-05-07 RX ORDER — IBUPROFEN 600 MG/1
600 TABLET ORAL EVERY 6 HOURS
Status: DISCONTINUED | OUTPATIENT
Start: 2019-05-08 | End: 2019-05-08 | Stop reason: HOSPADM

## 2019-05-07 RX ORDER — DIPHENHYDRAMINE HYDROCHLORIDE 50 MG/ML
25 INJECTION INTRAMUSCULAR; INTRAVENOUS EVERY 4 HOURS PRN
Status: DISCONTINUED | OUTPATIENT
Start: 2019-05-07 | End: 2019-05-08 | Stop reason: HOSPADM

## 2019-05-07 RX ORDER — ROCURONIUM BROMIDE 10 MG/ML
INJECTION, SOLUTION INTRAVENOUS
Status: DISCONTINUED | OUTPATIENT
Start: 2019-05-07 | End: 2019-05-07

## 2019-05-07 RX ADMIN — OXYCODONE HYDROCHLORIDE AND ACETAMINOPHEN 1 TABLET: 10; 325 TABLET ORAL at 04:05

## 2019-05-07 RX ADMIN — HYDROMORPHONE HYDROCHLORIDE 2 MG: 2 INJECTION, SOLUTION INTRAMUSCULAR; INTRAVENOUS; SUBCUTANEOUS at 01:05

## 2019-05-07 RX ADMIN — ALBUMIN (HUMAN): 2.5 SOLUTION INTRAVENOUS at 10:05

## 2019-05-07 RX ADMIN — PREGABALIN 150 MG: 75 CAPSULE ORAL at 06:05

## 2019-05-07 RX ADMIN — ROCURONIUM BROMIDE 40 MG: 10 INJECTION, SOLUTION INTRAVENOUS at 08:05

## 2019-05-07 RX ADMIN — KETOROLAC TROMETHAMINE 30 MG: 30 INJECTION, SOLUTION INTRAMUSCULAR at 05:05

## 2019-05-07 RX ADMIN — FENTANYL CITRATE 100 MCG: 50 INJECTION, SOLUTION INTRAMUSCULAR; INTRAVENOUS at 08:05

## 2019-05-07 RX ADMIN — MIDAZOLAM HYDROCHLORIDE 2 MG: 1 INJECTION, SOLUTION INTRAMUSCULAR; INTRAVENOUS at 08:05

## 2019-05-07 RX ADMIN — PROPOFOL 200 MG: 10 INJECTION, EMULSION INTRAVENOUS at 08:05

## 2019-05-07 RX ADMIN — FENTANYL CITRATE 100 MCG: 50 INJECTION, SOLUTION INTRAMUSCULAR; INTRAVENOUS at 09:05

## 2019-05-07 RX ADMIN — CARBOXYMETHYLCELLULOSE SODIUM 2 DROP: 2.5 SOLUTION/ DROPS OPHTHALMIC at 08:05

## 2019-05-07 RX ADMIN — ONDANSETRON 8 MG: 8 TABLET, ORALLY DISINTEGRATING ORAL at 04:05

## 2019-05-07 RX ADMIN — FAMOTIDINE 20 MG: 20 TABLET, FILM COATED ORAL at 06:05

## 2019-05-07 RX ADMIN — SODIUM CHLORIDE, SODIUM LACTATE, POTASSIUM CHLORIDE, AND CALCIUM CHLORIDE: .6; .31; .03; .02 INJECTION, SOLUTION INTRAVENOUS at 05:05

## 2019-05-07 RX ADMIN — GLYCOPYRROLATE 0.2 MG: 0.2 INJECTION, SOLUTION INTRAMUSCULAR; INTRAVENOUS at 08:05

## 2019-05-07 RX ADMIN — ATROPINE SULFATE 0.25 MG: 0.4 INJECTION, SOLUTION INTRAMUSCULAR; INTRAVENOUS; SUBCUTANEOUS at 11:05

## 2019-05-07 RX ADMIN — SODIUM CHLORIDE, SODIUM LACTATE, POTASSIUM CHLORIDE, AND CALCIUM CHLORIDE: 600; 310; 30; 20 INJECTION, SOLUTION INTRAVENOUS at 11:05

## 2019-05-07 RX ADMIN — FENTANYL CITRATE 50 MCG: 50 INJECTION, SOLUTION INTRAMUSCULAR; INTRAVENOUS at 11:05

## 2019-05-07 RX ADMIN — LIDOCAINE HYDROCHLORIDE 75 MG: 20 INJECTION, SOLUTION INTRAVENOUS at 08:05

## 2019-05-07 RX ADMIN — SODIUM CHLORIDE, SODIUM LACTATE, POTASSIUM CHLORIDE, AND CALCIUM CHLORIDE: 600; 310; 30; 20 INJECTION, SOLUTION INTRAVENOUS at 08:05

## 2019-05-07 RX ADMIN — SODIUM CHLORIDE, SODIUM LACTATE, POTASSIUM CHLORIDE, AND CALCIUM CHLORIDE: 600; 310; 30; 20 INJECTION, SOLUTION INTRAVENOUS at 09:05

## 2019-05-07 RX ADMIN — ATROPINE SULFATE 0.25 MG: 0.4 INJECTION, SOLUTION INTRAMUSCULAR; INTRAVENOUS; SUBCUTANEOUS at 10:05

## 2019-05-07 RX ADMIN — ALBUMIN (HUMAN): 2.5 SOLUTION INTRAVENOUS at 11:05

## 2019-05-07 RX ADMIN — OXYCODONE HYDROCHLORIDE 5 MG: 5 TABLET ORAL at 12:05

## 2019-05-07 RX ADMIN — OXYCODONE HYDROCHLORIDE AND ACETAMINOPHEN 1 TABLET: 10; 325 TABLET ORAL at 08:05

## 2019-05-07 RX ADMIN — KETOROLAC TROMETHAMINE 30 MG: 30 INJECTION, SOLUTION INTRAMUSCULAR at 11:05

## 2019-05-07 RX ADMIN — CEFAZOLIN 2 G: 330 INJECTION, POWDER, FOR SOLUTION INTRAMUSCULAR; INTRAVENOUS at 09:05

## 2019-05-07 RX ADMIN — ACETAMINOPHEN 1000 MG: 500 TABLET, FILM COATED ORAL at 06:05

## 2019-05-07 RX ADMIN — ROCURONIUM BROMIDE 10 MG: 10 INJECTION, SOLUTION INTRAVENOUS at 10:05

## 2019-05-07 RX ADMIN — KETOROLAC TROMETHAMINE 30 MG: 30 INJECTION, SOLUTION INTRAMUSCULAR; INTRAVENOUS at 11:05

## 2019-05-07 RX ADMIN — ATROPINE SULFATE 0.5 MG: 0.4 INJECTION, SOLUTION INTRAMUSCULAR; INTRAVENOUS; SUBCUTANEOUS at 08:05

## 2019-05-07 NOTE — OP NOTE
OPERATIVE REPORT    DATE: 05/07/2019    PREOPERATIVE DIAGNOSIS  1. Postmenopausal bleeding    POSTOPERATIVE DIAGNOSIS  1. Postmenopausal bleeding    PROCEDURE:  1. Total Laparoscopic Hysterectomy  2. Bilateral Salpingoophorectomy    SURGEON: Stella Shaffer MD    ASSISTANT: Christian Collado MD (No qualified resident available); Karen Ziegler MD (PGY-1)    ANESTHESIA: General    COMPLICATIONS: None    EBL: 200 cc    IV FLUIDS: 2500 cc    URINE OUTPUT: 175 cc    FINDINGS: Enlarged fibroid uterus approximately 12 week size, normal tubes and ovaries bilaterally. Left fallopian tube adherent to left pelvic sidewall/bowel.     PROCEDURE: Patient was taken to the operating room where general anesthesia was administered and found to be adequate. She was prepped and draped in the dorsal lithotomy position. Giron catheter was placed into the bladder. A weighted sterile speculum was placed in the vagina. The anterior lip of the cervix was grasped with a single tooth tenaculum. The uterus was sounded to approximately 11 cm. Stay sutures of 0-Vicryl was placed at 12 o'clock and 6 o'clock on the cervix. A PORFIRIO manipulator was placed inside the endometrial cavity. Gloves were changed. A Veress needle was inserted into the umbilicus under tenting of the anterior abdominal wall. Placement into the peritoneal cavity was confirmed via saline drop test. The abdomen was insufflated to 25mm Hg using Carbon dioxide. A 10 mm infraumbilical skin incision was made with the scalpel. A 10 mm Optiview trocar was advanced through this incision. Excellent hemostasis was noted. The patient was placed in deep Trendelenburg. A 5 mm left lateral skin incision was made with a scalpel and a 5 mm trocar was advanced through this incision under visualization of the camera. A 5 mm right lateral skin incision was made with the scalpel. A 5 mm trocar was advanced through this incision under visualization of the camera. Excellent hemostasis was noted.  Attention was turned to the left round ligament. This was cauterized and transected and continued anteriorly to create the bladder flap to the midline. The left fallopian tube was noted to be adhered to the bowel and to the left pelvic sidewall. Adhesions were dissected bluntly and with monopolar cautery to avoid injury to the bowel. The left infundibulopelvic ligament was identified and transected with bipolar cautery. This was carried down to the level of the uterine vessels.  The vessels were cauterized but not transected. Attention was turned to the right round ligament. It was cauterized and transected and continued anteriorly to finish creating the bladder flap. The left infundibulopelvic ligament was identified and transected with bipolar cautery and then carried down to the level of the uterine vessels. The vessels were cauterized but not transected. Attention was turned to the anterior portion of the cervix. The bladder was dissected off the cervix. The posterior colpotomy was created. This was continued to the level of the uterine vessels bilaterally. The uterine vessels were cauterized with bipolar and the colpotomy was continued anteriorly. The uterus was removed vaginally. A moist laparotomy sponge inside of glove was placed in the vagina to maintain intraperitoneal pressure. The vaginal cuff was reapproximated with mattress sutures using 0-Vicryl suture using the Endostitch. The pelvis was copiously irrigated and suctioned. Excellent hemostasis was noted. Attention was turned to the anterior abdominal wall. The abdomen was deflated and all trocars were removed. The skin was closed with 4-0 Monocryl in a subcuticular fashion. The sponge and glove were removed.  Sponge, lap, and needle counts were correct x 2. The patient was taken to the recovery room in stable condition with the garcia in place.     Stella Shaffer MD  OB/GYN

## 2019-05-07 NOTE — MEDICAL/APP STUDENT
[facility]  Obstetrics & Gynecology  Progress Note    Patient Name: Ivana Masters  MRN: [id number]  Admission Date: 5/7/2019  Principal Problem: TLH/BSO    Subjective:    Interval History:   POD#0  Pain 4/10 with some spikes, but otherwise controlled with medications. Tolerated juice and ordered sandwich, but has not eaten. Denies N/V. Giron in place - 75 mL, clear yellow output.  No ambulation yet. No flatus yet or BM yet.          Scheduled Meds:   [START ON 5/8/2019] ibuprofen  600 mg Oral Q6H    ketorolac  30 mg Intravenous Q6H     Continuous Infusions:  PRN Meds:bisacodyl, diphenhydrAMINE, diphenhydrAMINE, HYDROmorphone, ondansetron, oxyCODONE-acetaminophen, oxyCODONE-acetaminophen, promethazine (PHENERGAN) IVPB, sodium chloride 0.9%    Review of patient's allergies indicates:  No Known Allergies    Objective:     Vital Signs (Most Recent):  Temp: 97.7 °F (36.5 °C) (05/07/19 1437)  Pulse: 69 (05/07/19 1437)  Resp: 16 (05/07/19 1437)  BP: 126/69 (05/07/19 1437)  SpO2: 97 % (05/07/19 1437) Vital Signs (24h Range):  Temp:  [97.6 °F (36.4 °C)-97.9 °F (36.6 °C)] 97.7 °F (36.5 °C)  Pulse:  [56-69] 69  Resp:  [16-18] 16  SpO2:  [94 %-100 %] 97 %  BP: (122-150)/(63-88) 126/69     Weight: 75.3 kg (166 lb)  Body mass index is 30.36 kg/m².  Patient's last menstrual period was 08/06/2014.    I&O (Last 24H):    Intake/Output Summary (Last 24 hours) at 5/7/2019 1609  Last data filed at 5/7/2019 1318  Gross per 24 hour   Intake 2900 ml   Output 450 ml   Net 2450 ml        Physical Exam   Constitutional: She is oriented to person, place, and time. She appears well-developed and well-nourished. No distress.   HENT:   Head: Normocephalic and atraumatic.   Eyes: EOM are normal.   Neck: Normal range of motion.   Cardiovascular: Normal rate and regular rhythm.   Pulmonary/Chest: Effort normal.   Abdominal: Soft.   Lap port sites covered with steri strips and bandaids.    Genitourinary:   Genitourinary  Comments: Giron catheter in place. Clear, yellow urine.   Neurological: She is oriented to person, place, and time.   Skin: Skin is warm and dry.   Psychiatric: She has a normal mood and affect. Her behavior is normal.       Laboratory:  None    Diagnostic Results:  None     Cabrera Min, MS3

## 2019-05-07 NOTE — PROGRESS NOTES
[facility]  Obstetrics & Gynecology  Progress Note    Patient Name: Ivana Masters  MRN: [id number]  Admission Date: 5/7/2019  Primary Care Provider: Milind You MD  Principal Problem: <principal problem not specified>    Subjective:     Interval History: POD#0  Patient doing well this afternoon. She reports pain is well controlled. She tolerated a sandwich for lunch and is drinking without N/V.  Giron in place, has yet to ambulate. Denies flatus/BM.     Scheduled Meds:   [START ON 5/8/2019] ibuprofen  600 mg Oral Q6H    ketorolac  30 mg Intravenous Q6H     Continuous Infusions:  PRN Meds:bisacodyl, diphenhydrAMINE, diphenhydrAMINE, HYDROmorphone, ondansetron, oxyCODONE-acetaminophen, oxyCODONE-acetaminophen, promethazine (PHENERGAN) IVPB, sodium chloride 0.9%    Review of patient's allergies indicates:  No Known Allergies    Objective:     Vital Signs (Most Recent):  Temp: 97.7 °F (36.5 °C) (05/07/19 1437)  Pulse: 69 (05/07/19 1437)  Resp: 16 (05/07/19 1437)  BP: 126/69 (05/07/19 1437)  SpO2: 97 % (05/07/19 1437) Vital Signs (24h Range):  Temp:  [97.6 °F (36.4 °C)-97.9 °F (36.6 °C)] 97.7 °F (36.5 °C)  Pulse:  [56-69] 69  Resp:  [16-18] 16  SpO2:  [94 %-100 %] 97 %  BP: (122-150)/(63-88) 126/69     Weight: 75.3 kg (166 lb)  Body mass index is 30.36 kg/m².  Patient's last menstrual period was 08/06/2014.    I&O (Last 24H):    Intake/Output Summary (Last 24 hours) at 5/7/2019 1627  Last data filed at 5/7/2019 1318  Gross per 24 hour   Intake 2900 ml   Output 450 ml   Net 2450 ml       Physical Exam:   Constitutional: She is oriented to person, place, and time. She appears well-developed and well-nourished.     Eyes: EOM are normal.    Neck: Normal range of motion.    Cardiovascular: Normal rate.     Pulmonary/Chest: Effort normal. No respiratory distress. She has no wheezes.        Abdominal: Soft. Bowel sounds are normal. She exhibits abdominal incision (port site incisions c/d/i). She  exhibits no distension. There is no tenderness. There is no rebound and no guarding.             Musculoskeletal: Normal range of motion.       Neurological: She is alert and oriented to person, place, and time.    Skin: Skin is warm and dry.    Psychiatric: She has a normal mood and affect. Her behavior is normal. Judgment and thought content normal.       Laboratory:  CBC: AM CBC        Assessment/Plan:     Active Diagnoses:    Diagnosis Date Noted POA    Postmenopausal bleeding [N95.0] 04/29/2019 Yes      Problems Resolved During this Admission:       Postop TLH/BSO  - Doing well. Afebrile, vital signs stable.  - Routine advances  - Pain control with PO meds  - Cont regular diet  - D/C IVFs in AM  - Discontinue garcia in AM, monitor UOP  - AM  CBC  - Encourage ambulation  - Prophylaxis: IS/TEDs/SCDs    HTN  - No meds  - monitor BP    GERD  - Continue pantoprazole       Gabby Dubose MD  Obstetrics & Gynecology  [facility]

## 2019-05-07 NOTE — TRANSFER OF CARE
"Anesthesia Transfer of Care Note    Patient: Ivana Masters    Procedure(s) Performed: Procedure(s) (LRB):  HYSTERECTOMY, TOTAL, LAPAROSCOPIC (N/A)    Patient location: PACU    Anesthesia Type: general    Transport from OR: Transported from OR on 2-3 L/min O2 by NC with adequate spontaneous ventilation. Continuous SpO2 monitoring in transport    Post pain: adequate analgesia    Post assessment: no apparent anesthetic complications    Post vital signs: stable    Level of consciousness: awake and alert    Nausea/Vomiting: no nausea/vomiting    Complications: none    Transfer of care protocol was followed      Last vitals:   Visit Vitals  /78 (BP Location: Right arm, Patient Position: Lying)   Pulse (P) 68   Temp 36.6 °C (97.8 °F) (Oral)   Resp (P) 16   Ht 5' 2" (1.575 m)   Wt 75.3 kg (166 lb)   LMP 08/06/2014   SpO2 (P) 100%   Breastfeeding? No   BMI 30.36 kg/m²     "

## 2019-05-07 NOTE — ANESTHESIA POSTPROCEDURE EVALUATION
Anesthesia Post Evaluation    Patient: Ivana Masters    Procedure(s) Performed: Procedure(s) (LRB):  HYSTERECTOMY, TOTAL, LAPAROSCOPIC (N/A)    Final Anesthesia Type: general  Patient location during evaluation: PACU  Patient participation: Yes- Able to Participate  Level of consciousness: awake and alert  Post-procedure vital signs: reviewed and stable  Pain management: adequate  Airway patency: patent  PONV status at discharge: No PONV  Anesthetic complications: no      Cardiovascular status: blood pressure returned to baseline  Respiratory status: unassisted, spontaneous ventilation and room air  Hydration status: euvolemic  Follow-up not needed.          Vitals Value Taken Time   /70 5/7/2019  1:21 PM   Temp 36.6 °C (97.9 °F) 5/7/2019  1:00 PM   Pulse 65 5/7/2019  1:28 PM   Resp 16 5/7/2019  1:20 PM   SpO2 100 % 5/7/2019  1:28 PM   Vitals shown include unvalidated device data.      No case tracking events are documented in the log.      Pain/Rob Score: Pain Rating Prior to Med Admin: 7 (5/7/2019  1:08 PM)  Pain Rating Post Med Admin: 3 (5/7/2019  1:08 PM)  Rob Score: 10 (5/7/2019  1:18 PM)

## 2019-05-07 NOTE — INTERVAL H&P NOTE
The patient has been examined and the H&P has been reviewed:    I concur with the findings and no changes have occurred since H&P was written.    Anesthesia/Surgery risks, benefits and alternative options discussed and understood by patient/family.    Patient has no complaints.   She took no meds at home, has taken lyrica and tylenol since arrival  Reviewed the surgical plan for removal of bilateral tubes and ovaries, uterus, and cervix  She would like to proceed with surgery as planned  To OR for planned procedures      Active Hospital Problems    Diagnosis  POA    Postmenopausal bleeding [N95.0]  Yes      Resolved Hospital Problems   No resolved problems to display.     Chiqui Palacios MD, PhD  OBGYN, PGY-3

## 2019-05-08 VITALS
SYSTOLIC BLOOD PRESSURE: 120 MMHG | HEART RATE: 68 BPM | HEIGHT: 62 IN | OXYGEN SATURATION: 98 % | TEMPERATURE: 99 F | BODY MASS INDEX: 30.55 KG/M2 | DIASTOLIC BLOOD PRESSURE: 60 MMHG | WEIGHT: 166 LBS | RESPIRATION RATE: 18 BRPM

## 2019-05-08 PROBLEM — Z90.722 S/P TOTAL HYSTERECTOMY AND BILATERAL SALPINGO-OOPHORECTOMY: Status: ACTIVE | Noted: 2019-05-08

## 2019-05-08 PROBLEM — Z90.710 S/P TOTAL HYSTERECTOMY AND BILATERAL SALPINGO-OOPHORECTOMY: Status: ACTIVE | Noted: 2019-05-08

## 2019-05-08 PROBLEM — Z90.79 S/P TOTAL HYSTERECTOMY AND BILATERAL SALPINGO-OOPHORECTOMY: Status: ACTIVE | Noted: 2019-05-08

## 2019-05-08 PROBLEM — K21.9 GASTROESOPHAGEAL REFLUX DISEASE WITHOUT ESOPHAGITIS: Status: ACTIVE | Noted: 2019-05-08

## 2019-05-08 LAB
BASOPHILS # BLD AUTO: 0.03 K/UL (ref 0–0.2)
BASOPHILS NFR BLD: 0.3 % (ref 0–1.9)
DIFFERENTIAL METHOD: ABNORMAL
EOSINOPHIL # BLD AUTO: 0.2 K/UL (ref 0–0.5)
EOSINOPHIL NFR BLD: 2.5 % (ref 0–8)
ERYTHROCYTE [DISTWIDTH] IN BLOOD BY AUTOMATED COUNT: 13.9 % (ref 11.5–14.5)
HCT VFR BLD AUTO: 31.4 % (ref 37–48.5)
HGB BLD-MCNC: 10.1 G/DL (ref 12–16)
LYMPHOCYTES # BLD AUTO: 2.5 K/UL (ref 1–4.8)
LYMPHOCYTES NFR BLD: 28.7 % (ref 18–48)
MCH RBC QN AUTO: 30.1 PG (ref 27–31)
MCHC RBC AUTO-ENTMCNC: 32.2 G/DL (ref 32–36)
MCV RBC AUTO: 94 FL (ref 82–98)
MONOCYTES # BLD AUTO: 0.6 K/UL (ref 0.3–1)
MONOCYTES NFR BLD: 6.3 % (ref 4–15)
NEUTROPHILS # BLD AUTO: 5.4 K/UL (ref 1.8–7.7)
NEUTROPHILS NFR BLD: 62 % (ref 38–73)
PLATELET # BLD AUTO: 172 K/UL (ref 150–350)
PMV BLD AUTO: 11.1 FL (ref 9.2–12.9)
RBC # BLD AUTO: 3.36 M/UL (ref 4–5.4)
WBC # BLD AUTO: 8.74 K/UL (ref 3.9–12.7)

## 2019-05-08 PROCEDURE — 25000003 PHARM REV CODE 250: Performed by: STUDENT IN AN ORGANIZED HEALTH CARE EDUCATION/TRAINING PROGRAM

## 2019-05-08 PROCEDURE — 36415 COLL VENOUS BLD VENIPUNCTURE: CPT

## 2019-05-08 PROCEDURE — 85025 COMPLETE CBC W/AUTO DIFF WBC: CPT

## 2019-05-08 PROCEDURE — 25000003 PHARM REV CODE 250: Performed by: OBSTETRICS & GYNECOLOGY

## 2019-05-08 PROCEDURE — 63600175 PHARM REV CODE 636 W HCPCS: Performed by: OBSTETRICS & GYNECOLOGY

## 2019-05-08 RX ORDER — IBUPROFEN 600 MG/1
600 TABLET ORAL EVERY 6 HOURS
Qty: 30 TABLET | Refills: 0 | Status: SHIPPED | OUTPATIENT
Start: 2019-05-08

## 2019-05-08 RX ORDER — OXYCODONE AND ACETAMINOPHEN 5; 325 MG/1; MG/1
1 TABLET ORAL EVERY 4 HOURS PRN
Qty: 30 TABLET | Refills: 0 | Status: SHIPPED | OUTPATIENT
Start: 2019-05-08 | End: 2019-07-08

## 2019-05-08 RX ORDER — OXYCODONE AND ACETAMINOPHEN 5; 325 MG/1; MG/1
1 TABLET ORAL EVERY 4 HOURS PRN
Qty: 30 TABLET | Refills: 0 | Status: SHIPPED | OUTPATIENT
Start: 2019-05-08 | End: 2019-05-08

## 2019-05-08 RX ORDER — IBUPROFEN 600 MG/1
600 TABLET ORAL EVERY 6 HOURS
Qty: 30 TABLET | Refills: 0 | OUTPATIENT
Start: 2019-05-08 | End: 2019-05-08

## 2019-05-08 RX ADMIN — SODIUM CHLORIDE, SODIUM LACTATE, POTASSIUM CHLORIDE, AND CALCIUM CHLORIDE: .6; .31; .03; .02 INJECTION, SOLUTION INTRAVENOUS at 01:05

## 2019-05-08 RX ADMIN — OXYCODONE HYDROCHLORIDE AND ACETAMINOPHEN 1 TABLET: 10; 325 TABLET ORAL at 01:05

## 2019-05-08 RX ADMIN — KETOROLAC TROMETHAMINE 30 MG: 30 INJECTION, SOLUTION INTRAMUSCULAR at 05:05

## 2019-05-08 RX ADMIN — ONDANSETRON 8 MG: 8 TABLET, ORALLY DISINTEGRATING ORAL at 08:05

## 2019-05-08 RX ADMIN — OXYCODONE AND ACETAMINOPHEN 1 TABLET: 5; 325 TABLET ORAL at 08:05

## 2019-05-08 NOTE — SUBJECTIVE & OBJECTIVE
Interval History: No acute events overnight. Pain well controlled. Tolerating regular diet without nausea and vomiting. Giron catheter just removed, patient has not yet ambulated. She is passing flatus, no bowel movement.    Scheduled Meds:   ibuprofen  600 mg Oral Q6H    ketorolac  30 mg Intravenous Q6H    pantoprazole  40 mg Oral Daily     Continuous Infusions:   lactated ringers 100 mL/hr at 05/08/19 0131     PRN Meds:bisacodyl, diphenhydrAMINE, diphenhydrAMINE, HYDROmorphone, ondansetron, oxyCODONE-acetaminophen, oxyCODONE-acetaminophen, promethazine (PHENERGAN) IVPB, sodium chloride 0.9%    Review of patient's allergies indicates:  No Known Allergies    Objective:     Vital Signs (Most Recent):  Temp: 97.3 °F (36.3 °C) (05/08/19 0319)  Pulse: 63 (05/08/19 0319)  Resp: 18 (05/08/19 0319)  BP: (!) 94/51 (05/08/19 0319)  SpO2: 96 % (05/08/19 0319) Vital Signs (24h Range):  Temp:  [97.3 °F (36.3 °C)-98.3 °F (36.8 °C)] 97.3 °F (36.3 °C)  Pulse:  [56-69] 63  Resp:  [16-18] 18  SpO2:  [94 %-100 %] 96 %  BP: ()/(51-88) 94/51     Weight: 75.3 kg (166 lb)  Body mass index is 30.36 kg/m².  Patient's last menstrual period was 08/06/2014.    I&O (Last 24H):    Intake/Output Summary (Last 24 hours) at 5/8/2019 0542  Last data filed at 5/8/2019 0318  Gross per 24 hour   Intake 2900 ml   Output 2000 ml   Net 900 ml       Physical Exam:   Constitutional: She appears well-developed and well-nourished. No distress.    HENT:   Head: Normocephalic and atraumatic.    Eyes: Conjunctivae are normal.    Neck: Normal range of motion.    Cardiovascular: Normal rate, regular rhythm and normal heart sounds.     Pulmonary/Chest: Effort normal and breath sounds normal. No respiratory distress.        Abdominal: Soft. Bowel sounds are normal. She exhibits abdominal incision (All 3 port sites with steri strips in place, c/d/i). She exhibits no distension. There is no tenderness. There is no rebound and no guarding.              Musculoskeletal: She exhibits no edema or tenderness.       Neurological: She is alert.    Skin: Skin is warm and dry. She is not diaphoretic.        Laboratory:  AM CBC pending, starting H/H: 13/39    Diagnostic Results:  None

## 2019-05-08 NOTE — DISCHARGE INSTRUCTIONS
Discharge Instructions for Laparoscopic Hysterectomy  You had a procedure called laparoscopic hysterectomy. A surgeon removed your uterus using instruments inserted through small incisions in your abdomen. These incisions may be tender or sore. You may also have pain in your upper back or shoulders. This is from the gas used to enlarge your abdomen to allow your doctor to see inside your pelvis and perform the procedure. This pain usually goes away in a day or two. It usually takes from 1 to 4 weeks to recover from laparoscopic hysterectomy. Remember, though, that recovery time varies from woman to woman. Here's what you can do to speed your recovery following surgery.  Home care   · Continue the coughing and deep breathing exercises that you learned in the hospital.  · Take your medications exactly as directed by your doctor.  · Avoid constipation.  ¨ Eat fruits, vegetables, and whole grains.  ¨ Drink 6 to 8 glasses of water a day, unless told to do otherwise.  ¨ Use a laxative or a mild stool softener if your doctor says it's OK.  · Shower as usual. Wash your incisions with mild soap and water. Pat dry.  · Don't use oils, powders, or lotions on your incisions.  · Don't put anything in your vagina until your doctor says it's safe to do so. Don't use tampons or douches. Don't have sex.  · If you had both ovaries removed, report hot flashes, mood swings, and irritability to your doctor. There may be medications that can help you.  Activity  · Ask your doctor when you can start driving again. It's usually okay to drive as soon as you are free of pain and able to move comfortably from side to side. Don't drive while you are still taking opioid pain medications.  · Ask others to help with chores and errands while you recover.  · Dont lift anything heavier than 10 pounds for 6 weeks.  · Dont vacuum or do other strenuous activities until the doctor says it's OK.  · Walk as often as you feel able.  · Don't drive  for a few days after the surgery. You may drive as soon as you are able to move comfortably from side to side and when you are no longer taking narcotics.  · Climb stairs slowly and pause after every few steps.  Follow-up care  Make a follow-up appointment as directed by our staff.     When to call your doctor  Call your doctor right away if you have any of the following:  · Fever above 100.4°F (38°C) or chills  · Bright red vaginal bleeding or vaginal bleeding that soaks more than one sanitary pad per hour  · A foul smelling discharge from the vagina  · Trouble urinating or burning when you urinate  · Severe pain or bloating in your abdomen  · Redness, swelling, or drainage at your incision sites  · Shortness of breath or chest pain  · Nausea and vomiting   Date Last Reviewed: 5/19/2015  © 0502-0095 [employer]. [address] [place] [address]. All rights reserved. This information is not intended as a substitute for professional medical care. Always follow your healthcare professional's instructions.      Using an Incentive Spirometer    An incentive spirometer is a device that helps you do deep breathing exercises. These exercises expand your lungs, aid in circulation, and help prevent pneumonia. Deep breathing exercises also help you breathe better and improve the function of your lungs by:  · Keeping your lungs clear  · Strengthening your breathing muscles  · Helping prevent respiratory complications or problems  The incentive spirometer gives you a way to take an active part in recover. A nurse or therapist will teach you breathing exercises. To do these exercises, you will breathe in through your mouth and not your nose. The incentive spirometer only works correctly if you breathe in through your mouth.  Steps to clear lungs  Step 1. Exhale normally. Then, inhale normally.  · Relax and breathe out.  Step 2. Place your lips tightly around the mouthpiece.  · Make sure the device is  upright and not tilted.  Step 3. Inhale as much air as you can through the mouthpiece (don't breath through your nose).  · Inhale slowly and deeply.  · Hold your breath long enough to keep the balls or disk raised for at least 3 to 5 seconds, or as instructed by your healthcare provider.  · Some spirometers have an indicator to let you know that you are breathing in too fast. If the indicator goes off, breathe in more slowly.  Step 4. Repeat the exercise regularly.  · Do this exercise every hour while you're awake, or as instructed by your healthcare provider.  · If you were taught deep breathing and coughing exercises, do them regularly as instructed by your healthcare provider.   Follow-up care  Make a follow up appointment, or as directed. Also, follow up with your healthcare provider as advised or if your symptoms do not improve or continue to get worse.  When to call your healthcare provider  Call your healthcare provider right away if you have any of the following:  · Fever 100.4° (38°C) or higher, or as directed by your healthcare provider  · Brownish, bloody, or smelly sputum  Call 911  Call 911 if any of these occur:   · Shortness of breath that doesn't get better after taking your medicine  · Cool, moist, pale or blue skin  · Trouble breathing or swallowing, wheezing  · Fainting or loss of consciousness  · Feeling of fizziness or weakness or a sudden drop in blood pressure  · Feeling of doom or lightheaded  · Chest pain or rapid heart rate  Date Last Reviewed: 1/1/2017  © 0648-3522 [employer]. [address] [place] [address], [place] [address]. All rights reserved. This information is not intended as a substitute for professional medical care. Always follow your healthcare professional's instructions.          Acetaminophen; Oxycodone tablets  What is this medicine?  ACETAMINOPHEN; OXYCODONE (a set a NIRU lilliam fen; ox i KOE done) is a pain reliever. It is used to treat moderate to severe pain.  How  should I use this medicine?  Take this medicine by mouth with a full glass of water. Follow the directions on the prescription label. You can take it with or without food. If it upsets your stomach, take it with food. Take your medicine at regular intervals. Do not take it more often than directed.  A special MedGuide will be given to you by the pharmacist with each prescription and refill. Be sure to read this information carefully each time.  Talk to your pediatrician regarding the use of this medicine in children. Special care may be needed.  What side effects may I notice from receiving this medicine?  Side effects that you should report to your doctor or health care professional as soon as possible:  · allergic reactions like skin rash, itching or hives, swelling of the face, lips, or tongue  · breathing problems  · confusion  · redness, blistering, peeling or loosening of the skin, including inside the mouth  · signs and symptoms of liver injury like dark yellow or brown urine; general ill feeling or flu-like symptoms; light-colored stools; loss of appetite; nausea; right upper belly pain; unusually weak or tired; yellowing of the eyes or skin  · signs and symptoms of low blood pressure like dizziness; feeling faint or lightheaded, falls; unusually weak or tired  · trouble passing urine or change in the amount of urine  Side effects that usually do not require medical attention (report to your doctor or health care professional if they continue or are bothersome):  · constipation  · dry mouth  · nausea, vomiting  · tiredness  What may interact with this medicine?  This medicine may interact with the following medications:  · alcohol  · antihistamines for allergy, cough and cold  · antiviral medicines for HIV or AIDS  · atropine  · certain antibiotics like clarithromycin, erythromycin, linezolid, rifampin  · certain medicines for anxiety or sleep  · certain medicines for bladder problems like oxybutynin,  tolterodine  · certain medicines for depression like amitriptyline, fluoxetine, sertraline  · certain medicines for fungal infections like ketoconazole, itraconazole, voriconazole  · certain medicines for migraine headache like almotriptan, eletriptan, frovatriptan, naratriptan, rizatriptan, sumatriptan, zolmitriptan  · certain medicines for nausea or vomiting like dolasetron, ondansetron, palonosetron  · certain medicines for Parkinson's disease like benztropine, trihexyphenidyl  · certain medicines for seizures like phenobarbital, phenytoin, primidone  · certain medicines for stomach problems like dicyclomine, hyoscyamine  · certain medicines for travel sickness like scopolamine  · diuretics  · general anesthetics like halothane, isoflurane, methoxyflurane, propofol  · ipratropium  · local anesthetics like lidocaine, pramoxine, tetracaine  · MAOIs like Carbex, Eldepryl, Marplan, Nardil, and Parnate  · medicines that relax muscles for surgery  · methylene blue  · nilotinib  · other medicines with acetaminophen  · other narcotic medicines for pain or cough  · phenothiazines like chlorpromazine, mesoridazine, prochlorperazine, thioridazine  What if I miss a dose?  If you miss a dose, take it as soon as you can. If it is almost time for your next dose, take only that dose. Do not take double or extra doses.  Where should I keep my medicine?  Keep out of the reach of children. This medicine can be abused. Keep your medicine in a safe place to protect it from theft. Do not share this medicine with anyone. Selling or giving away this medicine is dangerous and against the law.  This medicine may cause accidental overdose and death if it taken by other adults, children, or pets. Mix any unused medicine with a substance like cat litter or coffee grounds. Then throw the medicine away in a sealed container like a sealed bag or a coffee can with a lid. Do not use the medicine after the expiration date.  Store at room  temperature between 20 and 25 degrees C (68 and 77 degrees F).  What should I tell my health care provider before I take this medicine?  They need to know if you have any of these conditions:  · brain tumor  · Crohn's disease, inflammatory bowel disease, or ulcerative colitis  · drug abuse or addiction  · head injury  · heart or circulation problems  · if you often drink alcohol  · kidney disease or problems going to the bathroom  · liver disease  · lung disease, asthma, or breathing problems  · an unusual or allergic reaction to acetaminophen, oxycodone, other opioid analgesics, other medicines, foods, dyes, or preservatives  · pregnant or trying to get pregnant  · breast-feeding  What should I watch for while using this medicine?  Tell your doctor or health care professional if your pain does not go away, if it gets worse, or if you have new or a different type of pain. You may develop tolerance to the medicine. Tolerance means that you will need a higher dose of the medication for pain relief. Tolerance is normal and is expected if you take this medicine for a long time.  Do not suddenly stop taking your medicine because you may develop a severe reaction. Your body becomes used to the medicine. This does NOT mean you are addicted. Addiction is a behavior related to getting and using a drug for a non-medical reason. If you have pain, you have a medical reason to take pain medicine. Your doctor will tell you how much medicine to take. If your doctor wants you to stop the medicine, the dose will be slowly lowered over time to avoid any side effects.  There are different types of narcotic medicines (opiates). If you take more than one type at the same time or if you are taking another medicine that also causes drowsiness, you may have more side effects. Give your health care provider a list of all medicines you use. Your doctor will tell you how much medicine to take. Do not take more medicine than directed. Call  emergency for help if you have problems breathing or unusual sleepiness.  Do not take other medicines that contain acetaminophen with this medicine. Always read labels carefully. If you have questions, ask your doctor or pharmacist.  If you take too much acetaminophen get medical help right away. Too much acetaminophen can be very dangerous and cause liver damage. Even if you do not have symptoms, it is important to get help right away.  You may get drowsy or dizzy. Do not drive, use machinery, or do anything that needs mental alertness until you know how this medicine affects you. Do not stand or sit up quickly, especially if you are an older patient. This reduces the risk of dizzy or fainting spells. Alcohol may interfere with the effect of this medicine. Avoid alcoholic drinks.  The medicine will cause constipation. Try to have a bowel movement at least every 2 to 3 days. If you do not have a bowel movement for 3 days, call your doctor or health care professional.  Your mouth may get dry. Chewing sugarless gum or sucking hard candy, and drinking plenty or water may help. Contact your doctor if the problem does not go away or is severe.  NOTE:This sheet is a summary. It may not cover all possible information. If you have questions about this medicine, talk to your doctor, pharmacist, or health care provider. Copyright© 2017 [employer]          Ibuprofen tablets and capsules  What is this medicine?  IBUPROFEN (eye BYOO proe fen) is a non-steroidal anti-inflammatory drug (NSAID). It is used for dental pain, fever, headaches or migraines, osteoarthritis, rheumatoid arthritis, or painful monthly periods. It can also relieve minor aches and pains caused by a cold, flu, or sore throat.  How should I use this medicine?  Take this medicine by mouth with a glass of water. Follow the directions on the prescription label. Take this medicine with food if your stomach gets upset. Try to not lie down for at least 10 minutes  after you take the medicine. Take your medicine at regular intervals. Do not take your medicine more often than directed.  A special MedGuide will be given to you by the pharmacist with each prescription and refill. Be sure to read this information carefully each time.  Talk to your pediatrician regarding the use of this medicine in children. Special care may be needed.  What side effects may I notice from receiving this medicine?  Side effects that you should report to your doctor or health care professional as soon as possible:  · allergic reactions like skin rash, itching or hives, swelling of the face, lips, or tongue  · severe stomach pain  · signs and symptoms of bleeding such as bloody or black, tarry stools; red or dark-brown urine; spitting up blood or brown material that looks like coffee grounds; red spots on the skin; unusual bruising or bleeding from the eye, gums, or nose  · signs and symptoms of a blood clot such as changes in vision; chest pain; severe, sudden headache; trouble speaking; sudden numbness or weakness of the face, arm, or leg  · unexplained weight gain or swelling  · unusually weak or tired  · yellowing of eyes or skin  Side effects that usually do not require medical attention (report to your doctor or health care professional if they continue or are bothersome):  · bruising  · diarrhea  · dizziness, drowsiness  · headache  · nausea, vomiting  What may interact with this medicine?  Do not take this medicine with any of the following medications:  · cidofovir  · ketorolac  · methotrexate  · pemetrexed  This medicine may also interact with the following medications:  · alcohol  · aspirin  · diuretics  · lithium  · other drugs for inflammation like prednisone  · warfarin  What if I miss a dose?  If you miss a dose, take it as soon as you can. If it is almost time for your next dose, take only that dose. Do not take double or extra doses.  Where should I keep my medicine?  Keep out of the  reach of children.  Store at room temperature between 15 and 30 degrees C (59 and 86 degrees F). Keep container tightly closed. Throw away any unused medicine after the expiration date.  What should I tell my health care provider before I take this medicine?  They need to know if you have any of these conditions:  · asthma  · cigarette smoker  · drink more than 3 alcohol containing drinks a day  · heart disease or circulation problems such as heart failure or leg edema (fluid retention)  · high blood pressure  · kidney disease  · liver disease  · stomach bleeding or ulcers  · an unusual or allergic reaction to ibuprofen, aspirin, other NSAIDS, other medicines, foods, dyes, or preservatives  · pregnant or trying to get pregnant  · breast-feeding  What should I watch for while using this medicine?  Tell your doctor or healthcare professional if your symptoms do not start to get better or if they get worse.  This medicine does not prevent heart attack or stroke. In fact, this medicine may increase the chance of a heart attack or stroke. The chance may increase with longer use of this medicine and in people who have heart disease. If you take aspirin to prevent heart attack or stroke, talk with your doctor or health care professional.  Do not take other medicines that contain aspirin, ibuprofen, or naproxen with this medicine. Side effects such as stomach upset, nausea, or ulcers may be more likely to occur. Many medicines available without a prescription should not be taken with this medicine.  This medicine can cause ulcers and bleeding in the stomach and intestines at any time during treatment. Ulcers and bleeding can happen without warning symptoms and can cause death. To reduce your risk, do not smoke cigarettes or drink alcohol while you are taking this medicine.  You may get drowsy or dizzy. Do not drive, use machinery, or do anything that needs mental alertness until you know how this medicine affects you. Do  not stand or sit up quickly, especially if you are an older patient. This reduces the risk of dizzy or fainting spells.  This medicine can cause you to bleed more easily. Try to avoid damage to your teeth and gums when you brush or floss your teeth.  This medicine may be used to treat migraines. If you take migraine medicines for 10 or more days a month, your migraines may get worse. Keep a diary of headache days and medicine use. Contact your healthcare professional if your migraine attacks occur more frequently.  NOTE:This sheet is a summary. It may not cover all possible information. If you have questions about this medicine, talk to your doctor, pharmacist, or health care provider. Copyright© 2017 [employer]

## 2019-05-08 NOTE — NURSING
8:39 AM  In agreement and eager for DC.  Tolerating good PO and ambulating well.  Lap sites CDI and no vaginal bleeding to peripad.  Voiding with good UOP.  VU of DC instructions; paperwork and TLH packet passed. IV removed with cath tip intact, WNL. IS provided for home use.     To be DCd home with family.  Will be escorted downstairs via  transport team after Dr Shaffer rounds or DC meds delivered.  Free from falls or skin breakdown this hospital admission.      11:16 AM - MD Edmund has Rounded     Pharmacy on floor c DC meds , Transport requested per EHR.

## 2019-05-08 NOTE — PROGRESS NOTES
[facility]  Obstetrics & Gynecology  Progress Note    Patient Name: Ivana Masters  MRN: [id number]  Admission Date: 2019  Primary Care Provider: Milind You MD  Principal Problem: S/P total hysterectomy and bilateral salpingo-oophorectomy    Subjective:     HPI:  59 yo  with postmenopausal bleeding presents for scheduled TLH/BSO.    Interval History: No acute events overnight. Pain well controlled. Tolerating regular diet without nausea and vomiting. Giron catheter just removed, patient has not urinated. She has ambulated without difficulty. She has not yet passed flatus.    Scheduled Meds:   ibuprofen  600 mg Oral Q6H    ketorolac  30 mg Intravenous Q6H    pantoprazole  40 mg Oral Daily     Continuous Infusions:   lactated ringers 100 mL/hr at 19 0131     PRN Meds:bisacodyl, diphenhydrAMINE, diphenhydrAMINE, HYDROmorphone, ondansetron, oxyCODONE-acetaminophen, oxyCODONE-acetaminophen, promethazine (PHENERGAN) IVPB, sodium chloride 0.9%    Review of patient's allergies indicates:  No Known Allergies    Objective:     Vital Signs (Most Recent):  Temp: 97.3 °F (36.3 °C) (19)  Pulse: 63 (19)  Resp: 18 (19)  BP: (!) 94/51 (19)  SpO2: 96 % (19) Vital Signs (24h Range):  Temp:  [97.3 °F (36.3 °C)-98.3 °F (36.8 °C)] 97.3 °F (36.3 °C)  Pulse:  [56-69] 63  Resp:  [16-18] 18  SpO2:  [94 %-100 %] 96 %  BP: ()/(51-88) 94/51     Weight: 75.3 kg (166 lb)  Body mass index is 30.36 kg/m².  Patient's last menstrual period was 2014.    I&O (Last 24H):    Intake/Output Summary (Last 24 hours) at 2019 0542  Last data filed at 2019 0318  Gross per 24 hour   Intake 2900 ml   Output 2000 ml   Net 900 ml       Physical Exam:   Constitutional: She appears well-developed and well-nourished. No distress.    HENT:   Head: Normocephalic and atraumatic.    Eyes: Conjunctivae are normal.    Neck: Normal range of motion.     Cardiovascular: Normal rate, regular rhythm and normal heart sounds.     Pulmonary/Chest: Effort normal and breath sounds normal. No respiratory distress.        Abdominal: Soft. Bowel sounds are normal. She exhibits abdominal incision (All 3 port sites with steri strips in place, c/d/i). She exhibits no distension. There is no tenderness. There is no rebound and no guarding.             Musculoskeletal: She exhibits no edema or tenderness.       Neurological: She is alert.    Skin: Skin is warm and dry. She is not diaphoretic.        Laboratory:  AM CBC pending, starting H/H: 13/39    Diagnostic Results:  None    Assessment/Plan:     * S/P TLH/BSO  -- POD #1, doing well.   -- Pain well controlled with medications  -- Tolerating regular diet, D/C IV fluids  -- Giron catheter removed. Continue to monitor urine output.  -- Encourage ambulation.  -- Will plan for discharge as patient continues to meet milestones.    GERD  -- Continue home pantoprazole      Karen Ziegler MD  Obstetrics & Gynecology  [facility]

## 2019-05-08 NOTE — PLAN OF CARE
Pt discharging home today, family to transport home.    Pt confirms no CM needs for discharge.     05/08/19 0951   Final Note   Assessment Type Final Discharge Note   Anticipated Discharge Disposition Home   What phone number can be called within the next 1-3 days to see how you are doing after discharge? [phone]   Hospital Follow Up  Appt(s) scheduled? Yes   Discharge plans and expectations educations in teach back method with documentation complete? Yes   Right Care Referral Info   Post Acute Recommendation No Care

## 2019-05-08 NOTE — DISCHARGE SUMMARY
[facility]  Obstetrics & Gynecology  Discharge Summary    Patient Name: Ivana Masters  MRN: [id number]  Admission Date: 2019  Hospital Length of Stay: 0 days  Discharge Date and Time:  2019 8:33 AM  Attending Physician: Stella Shaffer MD   Discharging Provider: Karen Ziegler MD  Primary Care Provider: Milind You MD    HPI: 57 yo  with postmenopausal bleeding presents for scheduled TLH/BSO.    Hospital Course:   2019: Uncomplicated TLH/BSO  2019: POD #1. No acute events overnight. Pain well controlled. Tolerating regular diet without nausea and vomiting. Giron catheter removed, patient has urinated. She has ambulated without difficulty. She has passed flatus. Stable for discharge.      Procedure(s) (LRB):  HYSTERECTOMY, TOTAL, LAPAROSCOPIC (N/A)     Consults: None    Significant Diagnostic Studies:   Recent Labs   Lab 19  0552   WBC 8.74   HGB 10.1*   HCT 31.4*   MCV 94          Pending Diagnostic Studies:     None        Final Active Diagnoses:    Diagnosis Date Noted POA    PRINCIPAL PROBLEM:  S/P TLH/BSO [Z90.710, Z90.79, Z90.722] 2019 Not Applicable    GERD [K21.9] 2019 Unknown    Postmenopausal bleeding [N95.0] 2019 Yes      Problems Resolved During this Admission:        Discharged Condition: good    Disposition: Home or Self Care    Follow Up:  Follow-up Information     Stella Shaffer MD. Schedule an appointment as soon as possible for a visit in 6 weeks.    Specialty:  Obstetrics and Gynecology  Why:  Postop visit  Contact information:  [address] [facility] [address] [phone]                 Patient Instructions:      Diet Adult Regular     Other restrictions (specify):   Order Comments: Pelvic rest for at least 6-8 weeks. Nothing in vagina - no sex, tampons, or douching for 6-8 weeks     Notify your health care provider if you experience any of the following:   Order Comments: Heavy vaginal bleeding  saturating more than 1 pad per hour for at least 2 hours.     Notify your health care provider if you experience any of the following:  increased confusion or weakness     Notify your health care provider if you experience any of the following:  persistent dizziness, light-headedness, or visual disturbances     Notify your health care provider if you experience any of the following:  worsening rash     Notify your health care provider if you experience any of the following:  severe persistent headache     Notify your health care provider if you experience any of the following:  difficulty breathing or increased cough     Notify your health care provider if you experience any of the following:  redness, tenderness, or signs of infection (pain, swelling, redness, odor or green/yellow discharge around incision site)     Notify your health care provider if you experience any of the following:  severe uncontrolled pain     Notify your health care provider if you experience any of the following:  persistent nausea and vomiting or diarrhea     Notify your health care provider if you experience any of the following:  temperature >100.4     Activity as tolerated     Medications:  Reconciled Home Medications:      Medication List      START taking these medications    ibuprofen 600 MG tablet  Commonly known as:  ADVIL,MOTRIN  Take 1 tablet (600 mg total) by mouth every 6 (six) hours.     oxyCODONE-acetaminophen 5-325 mg per tablet  Commonly known as:  PERCOCET  Take 1 tablet by mouth every 4 (four) hours as needed.        CONTINUE taking these medications    FISH OIL ORAL  Take 1,000 mg by mouth once daily.     omeprazole 10 MG capsule  Commonly known as:  PRILOSEC  Take 10 mg by mouth once daily.     WOMEN'S DAILY MULTIVITAMIN ORAL  Take 1 tablet by mouth once daily.        STOP taking these medications    progesterone 200 MG capsule  Commonly known as:  PROMETRIUM            Karen Ziegler MD  Obstetrics &  Gynecology  [facility]

## 2019-05-08 NOTE — PLAN OF CARE
Problem: Adult Inpatient Plan of Care  Goal: Plan of Care Review  Outcome: Ongoing (interventions implemented as appropriate)  Patient is s/p TLH/BSO POD#1. AAOx4, on RA, afebrile, SBP in lower 90s during this shift, asymptomatic. Pain managed effectively with scheduled Toradol 30mg IVP and prn Percocet 10mg PO x 2. Patient ambulated to her bathroom once during this shift.

## 2019-05-08 NOTE — ASSESSMENT & PLAN NOTE
-- POD #1, doing well.   -- Pain well controlled with medications  -- Tolerating regular diet, D/C IV fluids  -- Giron catheter removed. Continue to monitor urine output.  -- Encourage ambulation.  -- Will plan for discharge as patient continues to meet milestones.

## 2019-05-08 NOTE — MEDICAL/APP STUDENT
[facility]  Obstetrics & Gynecology  Progress Note    Patient Name: Ivana Masters  MRN: [id number]  Admission Date: 5/7/2019  Primary Care Provider: Milind You MD  Principal Problem: S/P total hysterectomy and bilateral salpingo-oophorectomy    Subjective:     Interval History: POD#1  Patient doing well this AM. She reports pain was controlled last night with IV Toradol and PO percocet. She reports pain level at 7/10 this AM prior to medications. She tolerated both lunch and dinner with no N/V. Ambulating well. Giron removed this AM, has not voided. Denies flatus/BM.    Scheduled Meds:   ibuprofen  600 mg Oral Q6H    ketorolac  30 mg Intravenous Q6H    pantoprazole  40 mg Oral Daily     Continuous Infusions:   lactated ringers 100 mL/hr at 05/08/19 0131     PRN Meds:bisacodyl, diphenhydrAMINE, diphenhydrAMINE, HYDROmorphone, ondansetron, oxyCODONE-acetaminophen, oxyCODONE-acetaminophen, promethazine (PHENERGAN) IVPB, sodium chloride 0.9%    Review of patient's allergies indicates:  No Known Allergies    Objective:     Vital Signs (Most Recent):  Temp: 97.3 °F (36.3 °C) (05/08/19 0319)  Pulse: 63 (05/08/19 0319)  Resp: 18 (05/08/19 0319)  BP: (!) 94/51 (05/08/19 0319)  SpO2: 96 % (05/08/19 0319) Vital Signs (24h Range):  Temp:  [97.3 °F (36.3 °C)-98.3 °F (36.8 °C)] 97.3 °F (36.3 °C)  Pulse:  [56-69] 63  Resp:  [16-18] 18  SpO2:  [94 %-100 %] 96 %  BP: ()/(51-88) 94/51     Weight: 75.3 kg (166 lb)  Body mass index is 30.36 kg/m².  Patient's last menstrual period was 08/06/2014.    I&O (Last 24H):    Intake/Output Summary (Last 24 hours) at 5/8/2019 0609  Last data filed at 5/8/2019 0556  Gross per 24 hour   Intake 2900 ml   Output 2250 ml   Net 650 ml       Physical Exam:   Constitutional: She is oriented to person, place, and time. She appears well-developed and well-nourished. No distress.    HENT:   Head: Normocephalic and atraumatic.    Eyes: EOM are normal.    Neck: Normal  range of motion.    Cardiovascular: Normal rate, regular rhythm and normal heart sounds.          Abdominal: Soft. Bowel sounds are normal. She exhibits no distension and no abdominal incision (port sites c/d/i). There is tenderness (suprapubic region). There is no rebound and no guarding.             Musculoskeletal: Normal range of motion and moves all extremeties.       Neurological: She is alert and oriented to person, place, and time.    Skin: Skin is warm and dry. No rash noted.    Psychiatric: She has a normal mood and affect. Her behavior is normal.       Laboratory:  CBC: No results for input(s): WBC, RBC, HGB, HCT, PLT, MCV, MCH, MCHC in the last 48 hours.    Diagnostic Results:  Labs: Reviewed  ***    Assessment/Plan:     Active Diagnoses:    Diagnosis Date Noted POA    PRINCIPAL PROBLEM:  S/P TLH/BSO [Z90.710, Z90.79, Z90.722] 05/08/2019 Not Applicable    GERD [K21.9] 05/08/2019 Unknown    Postmenopausal bleeding [N95.0] 04/29/2019 Yes      Problems Resolved During this Admission:       Postop TLH/BSO  - Doing well. Afebrile, vital signs stable.  - Routine advances  - Pain control with PO meds  - Cont regular diet  - D/C IVFs in AM  - Monitor UOP  - CBC pending  - Encourage ambulation  - Prophylaxis: IS/TEDs/SCDs     HTN  - No meds  - monitor BP     GERD  - Continue pantoprazole         [facility]  Obstetrics & Gynecology  [facility]

## 2019-05-08 NOTE — HOSPITAL COURSE
5/7/2019 - TLH/BSO performed without complication.   5/8/2019 - POD #1. Pain well controlled. Tolerating PO intake. Giron removed. +Flatus.

## 2019-05-08 NOTE — PROCEDURES
Biopsy (Gynecological)  Date/Time: 4/22/2019 2:00 PM  Performed by: Stella Shaffer MD  Authorized by: Stella Shaffer MD     Consent Done?:  Yes (Written)   Patient was prepped and draped in the normal sterile fashion.  Local anesthesia used?: No      Biopsy Location:  Uterus (Uterus sounded to 11 cm. Minimal tissue obtained..)  Estimated blood loss (cc):  1   Patient tolerated the procedure well with no immediate complications.

## 2019-05-10 NOTE — OP NOTE
Certification of Assistant at Surgery       Surgery Date: 5/7/2019     Participating Surgeons:  Surgeon(s) and Role:     * Stella Shaffer MD - Primary     * Christian Collado Jr., MD - Assisting    Procedures:  Procedure(s) (LRB):  HYSTERECTOMY, TOTAL, LAPAROSCOPIC (N/A)  SALPINGO-OOPHORECTOMY, LAPAROSCOPIC (Bilateral)    Assistant Surgeon's Certification of Necessity:  I understand that section 1842 (b) (6) (d) of the Social Security Act generally prohibits Medicare Part B reasonable charge payment for the services of assistants at surgery in teaching hospitals when qualified residents are available to furnish such services. I certify that the services for which payment is claimed were medically necessary, and that no qualified resident was available to perform the services. I further understand that these services are subject to post-payment review by the Medicare carrier.      Christian Collado Jr, MD    05/10/2019  12:37 PM    Christian Collado MD

## 2019-05-16 ENCOUNTER — TELEPHONE (OUTPATIENT)
Dept: OBSTETRICS AND GYNECOLOGY | Facility: CLINIC | Age: 59
End: 2019-05-16

## 2019-05-16 NOTE — TELEPHONE ENCOUNTER
Called patient to review benign path. Doing well, just tired/weak. Gas pains - recommended GasX, stool softener, fiber. All questions answered.     FINAL PATHOLOGIC DIAGNOSIS  UTERUS, CERVIX AND BILATERAL ADNEXA WEIGHING 434 G SHOWING:  INACTIVE ENDOMETRIUM  MULTIPLE INTRAMURAL LEIOMYOMAS  NEGATIVE CERVIX  NEGATIVE OVARIES AND RIGHT FALLOPIAN TUBE  LEFT FALLOPIAN TUBE HAS A BENIGN PARATUBAL CYST PRESENT

## 2019-05-17 ENCOUNTER — PATIENT MESSAGE (OUTPATIENT)
Dept: OBSTETRICS AND GYNECOLOGY | Facility: CLINIC | Age: 59
End: 2019-05-17

## 2019-05-20 PROBLEM — E78.00 HYPERCHOLESTEROLEMIA: Status: ACTIVE | Noted: 2019-05-20

## 2019-06-18 ENCOUNTER — OFFICE VISIT (OUTPATIENT)
Dept: OBSTETRICS AND GYNECOLOGY | Facility: CLINIC | Age: 59
End: 2019-06-18
Payer: COMMERCIAL

## 2019-06-18 VITALS
WEIGHT: 168.19 LBS | DIASTOLIC BLOOD PRESSURE: 84 MMHG | BODY MASS INDEX: 30.77 KG/M2 | SYSTOLIC BLOOD PRESSURE: 126 MMHG

## 2019-06-18 DIAGNOSIS — Z90.79 S/P TOTAL HYSTERECTOMY AND BILATERAL SALPINGO-OOPHORECTOMY: Primary | ICD-10-CM

## 2019-06-18 DIAGNOSIS — Z90.722 S/P TOTAL HYSTERECTOMY AND BILATERAL SALPINGO-OOPHORECTOMY: Primary | ICD-10-CM

## 2019-06-18 DIAGNOSIS — Z90.710 S/P TOTAL HYSTERECTOMY AND BILATERAL SALPINGO-OOPHORECTOMY: Primary | ICD-10-CM

## 2019-06-18 PROCEDURE — 99024 POSTOP FOLLOW-UP VISIT: CPT | Mod: S$GLB,,, | Performed by: OBSTETRICS & GYNECOLOGY

## 2019-06-18 PROCEDURE — 99999 PR PBB SHADOW E&M-EST. PATIENT-LVL II: CPT | Mod: PBBFAC,,, | Performed by: OBSTETRICS & GYNECOLOGY

## 2019-06-18 PROCEDURE — 99999 PR PBB SHADOW E&M-EST. PATIENT-LVL II: ICD-10-PCS | Mod: PBBFAC,,, | Performed by: OBSTETRICS & GYNECOLOGY

## 2019-06-18 PROCEDURE — 99024 PR POST-OP FOLLOW-UP VISIT: ICD-10-PCS | Mod: S$GLB,,, | Performed by: OBSTETRICS & GYNECOLOGY

## 2019-06-18 NOTE — PROGRESS NOTES
Subjective:       Patient ID: Ivana Masters is a 58 y.o. female.    Chief Complaint:  Post-op Evaluation    History of Present Illness:  HPI  Postoperative Follow-up  Patient presents to the clinic 6 weeks status post TLH/BSO for abnormal uterine bleeding. Eating a regular diet without difficulty. Bowel movements are normal. She is not having any pain. She denies vaginal bleeding. She had pellets placed in 2019 and since then has had no menopausal symptoms since then.      FINAL PATHOLOGIC DIAGNOSIS  UTERUS, CERVIX AND BILATERAL ADNEXA WEIGHING 434 G SHOWING:  INACTIVE ENDOMETRIUM  MULTIPLE INTRAMURAL LEIOMYOMAS  NEGATIVE CERVIX  NEGATIVE OVARIES AND RIGHT FALLOPIAN TUBE  LEFT FALLOPIAN TUBE HAS A BENIGN PARATUBAL CYST PRESENT    GYN & OB History  Patient's last menstrual period was 2014.   Date of Last Pap: No result found    OB History    Para Term  AB Living   2 2           SAB TAB Ectopic Multiple Live Births                  # Outcome Date GA Lbr Nir/2nd Weight Sex Delivery Anes PTL Lv   2 Para      Vag-Spont      1 Para      Vag-Spont          Review of Systems  Review of Systems   Constitutional: Negative for chills, diaphoresis, fatigue and fever.   Respiratory: Negative for cough and shortness of breath.    Cardiovascular: Negative for chest pain and palpitations.   Gastrointestinal: Negative for abdominal pain, constipation, diarrhea, nausea and vomiting.   Genitourinary: Negative for dysuria, pelvic pain, vaginal bleeding, vaginal discharge and vaginal pain.   Neurological: Negative for headaches.   Psychiatric/Behavioral: Negative for depression. The patient is not nervous/anxious.            Objective:    Physical Exam:   Constitutional: She is oriented to person, place, and time. She appears well-developed and well-nourished. No distress.    HENT:   Head: Normocephalic and atraumatic.    Eyes: EOM are normal.    Neck: Normal range of motion.     Pulmonary/Chest: Effort  normal.        Abdominal: Soft. She exhibits abdominal incision (well-healed). She exhibits no distension and no mass. There is no tenderness. There is no rebound and no guarding. No hernia.     Genitourinary:   Genitourinary Comments: Normal external female genitalia. Vagina atrophic, vaginal cuff intact. Uterus/cervix surgically absent. No adnexal masses palpated.             Musculoskeletal: Normal range of motion and moves all extremeties.       Neurological: She is alert and oriented to person, place, and time.    Skin: Skin is warm. No rash noted.    Psychiatric: She has a normal mood and affect. Her behavior is normal. Judgment and thought content normal.          Assessment:        1. S/P TLH/BSO              Plan:      Ivana was seen today for post-op evaluation.    Diagnoses and all orders for this visit:    S/P TLH/BSO  - Doing well postoperatively.   - Reviewed benign pathology.   - No menopausal symptoms currently.   - Continue pelvic rest at least 2 weeks.    No orders of the defined types were placed in this encounter.      Follow up if symptoms worsen or fail to improve.

## 2019-07-11 ENCOUNTER — TELEPHONE (OUTPATIENT)
Dept: CARDIOLOGY | Facility: CLINIC | Age: 59
End: 2019-07-11

## 2019-07-11 NOTE — TELEPHONE ENCOUNTER
----- Message from Lauren Carrion sent at 7/11/2019  8:11 AM CDT -----  Contact: self  Type:  Patient Returning Call    Who Called:  self  Who Left Message for Patient:  Parveen  Does the patient know what this is regarding?:  yes  Best Call Back Number:  [phone] (home) [phone] (work)  Additional Information:  Patient states it is regarding her surgery. Please call patient. Thanks!

## 2019-07-11 NOTE — TELEPHONE ENCOUNTER
Returned patient's call in reference inquiry about missed call form someone name Parveen., Patient was advised perhaps the call was from Pre-OP reference to Colonoscopy scheduled for 07/12/2019. At this point the patient have not been contacted with a time to report for the Colonoscopy, or been given Pre-Op instructions. A message was sent via [employer] to Marilia in Surgery Dept. with the patient's MRN #.

## 2020-05-21 DIAGNOSIS — I10 HYPERTENSION, UNSPECIFIED TYPE: ICD-10-CM

## 2020-10-05 ENCOUNTER — PATIENT MESSAGE (OUTPATIENT)
Dept: ADMINISTRATIVE | Facility: HOSPITAL | Age: 60
End: 2020-10-05

## 2021-01-04 ENCOUNTER — PATIENT MESSAGE (OUTPATIENT)
Dept: ADMINISTRATIVE | Facility: HOSPITAL | Age: 61
End: 2021-01-04

## 2021-04-05 ENCOUNTER — PATIENT MESSAGE (OUTPATIENT)
Dept: ADMINISTRATIVE | Facility: HOSPITAL | Age: 61
End: 2021-04-05

## 2021-04-16 ENCOUNTER — PATIENT MESSAGE (OUTPATIENT)
Dept: RESEARCH | Facility: HOSPITAL | Age: 61
End: 2021-04-16

## 2021-07-06 ENCOUNTER — PATIENT MESSAGE (OUTPATIENT)
Dept: ADMINISTRATIVE | Facility: HOSPITAL | Age: 61
End: 2021-07-06

## 2021-10-05 ENCOUNTER — PATIENT MESSAGE (OUTPATIENT)
Dept: ADMINISTRATIVE | Facility: HOSPITAL | Age: 61
End: 2021-10-05

## 2022-01-01 NOTE — LETTER
April 29, 2019      Stella Shaffer MD  [address] [facility] [address]           [facility] [address] [address]  Phone: [phone]  Fax: [phone]          Patient: Ivana Masters   MR Number: [id number]   YOB: 1960   Date of Visit: 4/29/2019       Dear Dr. Stella Shaffer:    Thank you for referring Ivana Masters to me for evaluation. Attached you will find relevant portions of my assessment and plan of care.    If you have questions, please do not hesitate to call me. I look forward to following Ivana Masters along with you.    Sincerely,    Milind You MD    Enclosure  CC:  No Recipients    If you would like to receive this communication electronically, please contact [email or web address] or [phone] to request more information on [employer] Link access.    For providers and/or their staff who would like to refer a patient to [facility], please contact us through our one-stop-shop provider referral line, [facility], at [phone].    If you feel you have received this communication in error or would no longer like to receive these types of communications, please e-mail [email or web address]          none

## 2022-01-21 ENCOUNTER — PATIENT MESSAGE (OUTPATIENT)
Dept: ADMINISTRATIVE | Facility: HOSPITAL | Age: 62
End: 2022-01-21

## 2022-05-12 ENCOUNTER — PATIENT OUTREACH (OUTPATIENT)
Dept: ADMINISTRATIVE | Facility: HOSPITAL | Age: 62
End: 2022-05-12

## 2023-03-08 ENCOUNTER — TELEPHONE (OUTPATIENT)
Dept: PRIMARY CARE CLINIC | Facility: CLINIC | Age: 63
End: 2023-03-08

## 2023-03-08 NOTE — TELEPHONE ENCOUNTER
----- Message from Priti Robledo sent at 3/8/2023  9:57 AM CST -----  Contact: Patient, [phone]  Calling because she had her eyes examed today and was told her blood pressure is high, 157/97. Please advise. Thanks.

## 2023-03-22 ENCOUNTER — PATIENT OUTREACH (OUTPATIENT)
Dept: ADMINISTRATIVE | Facility: HOSPITAL | Age: 63
End: 2023-03-22

## 2023-03-22 NOTE — PROGRESS NOTES
Health Maintenance Due   Topic Date Due    HIV Screening  Never done    Shingles Vaccine (1 of 2) Never done    Mammogram  02/13/2020    COVID-19 Vaccine (3 - Booster for Moderna series) 04/30/2021    Influenza Vaccine (1) Never done        Chart review done.    updated.   Immunizations reviewed & updated.   Care Everywhere updated.   DIS reviewed   No order entered  patient not seen since 2019

## 2023-09-18 ENCOUNTER — PATIENT MESSAGE (OUTPATIENT)
Dept: PRIMARY CARE CLINIC | Facility: CLINIC | Age: 63
End: 2023-09-18

## 2023-10-18 ENCOUNTER — PATIENT MESSAGE (OUTPATIENT)
Dept: CARDIOLOGY | Facility: CLINIC | Age: 63
End: 2023-10-18

## 2024-07-23 ENCOUNTER — OFFICE VISIT (OUTPATIENT)
Dept: PRIMARY CARE CLINIC | Facility: CLINIC | Age: 64
End: 2024-07-23
Payer: COMMERCIAL

## 2024-07-23 VITALS
OXYGEN SATURATION: 98 % | HEART RATE: 75 BPM | RESPIRATION RATE: 18 BRPM | SYSTOLIC BLOOD PRESSURE: 158 MMHG | WEIGHT: 149.94 LBS | DIASTOLIC BLOOD PRESSURE: 80 MMHG | HEIGHT: 62 IN | BODY MASS INDEX: 27.59 KG/M2

## 2024-07-23 DIAGNOSIS — L98.9 SKIN LESION: ICD-10-CM

## 2024-07-23 DIAGNOSIS — Z51.81 MEDICATION MONITORING ENCOUNTER: ICD-10-CM

## 2024-07-23 DIAGNOSIS — Z13.6 ENCOUNTER FOR SCREENING FOR CARDIOVASCULAR DISORDERS: ICD-10-CM

## 2024-07-23 DIAGNOSIS — Z90.3 HISTORY OF SLEEVE GASTRECTOMY: ICD-10-CM

## 2024-07-23 DIAGNOSIS — Z76.89 ENCOUNTER TO ESTABLISH CARE: Primary | ICD-10-CM

## 2024-07-23 DIAGNOSIS — Z12.31 BREAST CANCER SCREENING BY MAMMOGRAM: ICD-10-CM

## 2024-07-23 DIAGNOSIS — L40.9 PSORIASIS: ICD-10-CM

## 2024-07-23 DIAGNOSIS — Z23 NEED FOR VACCINATION: ICD-10-CM

## 2024-07-23 DIAGNOSIS — Z79.899 ENCOUNTER FOR LONG-TERM (CURRENT) USE OF OTHER MEDICATIONS: ICD-10-CM

## 2024-07-23 PROCEDURE — 99999 PR PBB SHADOW E&M-EST. PATIENT-LVL IV: CPT | Mod: PBBFAC,,, | Performed by: STUDENT IN AN ORGANIZED HEALTH CARE EDUCATION/TRAINING PROGRAM

## 2024-07-23 PROCEDURE — 99214 OFFICE O/P EST MOD 30 MIN: CPT | Mod: S$GLB,,, | Performed by: STUDENT IN AN ORGANIZED HEALTH CARE EDUCATION/TRAINING PROGRAM

## 2024-07-23 RX ORDER — HYDROCHLOROTHIAZIDE 25 MG/1
25 TABLET ORAL DAILY
COMMUNITY
Start: 2024-07-08

## 2024-07-23 RX ORDER — ZOSTER VACCINE RECOMBINANT, ADJUVANTED 50 MCG/0.5
0.5 KIT INTRAMUSCULAR ONCE
Qty: 1 EACH | Refills: 0 | Status: SHIPPED | OUTPATIENT
Start: 2024-07-23 | End: 2024-07-23

## 2024-07-23 RX ORDER — CLOTRIMAZOLE AND BETAMETHASONE DIPROPIONATE 10; .64 MG/G; MG/G
CREAM TOPICAL 2 TIMES DAILY
Qty: 45 G | Refills: 1 | Status: SHIPPED | OUTPATIENT
Start: 2024-07-23

## 2024-07-23 RX ORDER — KETOCONAZOLE 20 MG/ML
SHAMPOO, SUSPENSION TOPICAL
Qty: 120 ML | Refills: 3 | Status: SHIPPED | OUTPATIENT
Start: 2024-07-25

## 2024-07-23 NOTE — PROGRESS NOTES
Subjective:       Patient ID: Ivana Masters is a 63 y.o. female.    Chief Complaint: Establish Care and skin issues    HPI:  63 y.o. female presents to [facility] to establish care    Acute concerns?: Lesions to scalp began around February 2024, off and on. Has never experienced in the past. Skin lesion to left lateral leg that began about 2 weeks ago. No itching or pain. Very dry ans scaling. Not much feeling to lesions. Does have property in [place], but [place]'t [place] much [place] Tried CeraVe healing ointment without relief. Not growing recently.    HTN:  Home BP log?: 137/80 this morning at home  Medications: HCTZ 25 mg  Compliance?: Misses on rare occasion    Diet?: Tries to avoid carbohydrates  Exercise?: No regular    Last PCP?: Dr. Milind You  Allergies: NKDA  Medical History: GERD, HTN  Medications: HCTZ 25 mg, MVI, pantoprazole 10 mg  Surgical History: endometrial ablation, gastric sleeve, hysterectomy, salpingo oophorectomy bilateral  Family History: Mother breast cancer, father liver cancer; no known autoimmune disease, no dementia  Social History: Never smoker, Occasional EtOH, no illicits    Fasting?: No  Last blood work?: Feb 2023  HIV screening?: Amenable  Shingles vaccine?: Is interested  RSV vaccine?:     Review of Systems   Constitutional:  Negative for chills, diaphoresis, fatigue and fever.   Respiratory:  Negative for chest tightness and shortness of breath.    Cardiovascular:  Negative for chest pain and palpitations.   Gastrointestinal:  Negative for abdominal pain, diarrhea, nausea and vomiting.   Musculoskeletal:  Negative for joint swelling and myalgias.   Skin:  Positive for rash. Negative for wound.   Neurological:  Negative for dizziness and weakness.       Objective:      Vitals:    07/23/24 1511   BP: (!) 158/88   BP Location: Right arm   Patient Position: Sitting   BP Method: Medium (Manual)   Pulse: 75   Resp: 18   SpO2: 98%   Weight: 68 kg  "(149 lb 14.6 oz)   Height: 5' 2" (1.575 m)     Physical Exam  Vitals reviewed.   Constitutional:       General: She is not in acute distress.     Appearance: Normal appearance. She is not ill-appearing.   HENT:      Head: Normocephalic and atraumatic.   Eyes:      General:         Right eye: No discharge.         Left eye: No discharge.      Conjunctiva/sclera: Conjunctivae normal.   Cardiovascular:      Rate and Rhythm: Normal rate and regular rhythm.      Pulses: Normal pulses.      Heart sounds: No murmur heard.  Pulmonary:      Effort: Pulmonary effort is normal.      Breath sounds: Normal breath sounds.   Musculoskeletal:         General: No deformity.      Cervical back: Neck supple. No rigidity.   Lymphadenopathy:      Cervical: No cervical adenopathy.   Skin:     General: Skin is warm and dry.      Coloration: Skin is not jaundiced.      Comments: 2 satellite well-demarcated annular lesions left lateral thigh. ~1.5 & 2.5 cm raised erythematous lesions with central scaling   Neurological:      General: No focal deficit present.      Mental Status: She is alert and oriented to person, place, and time.   Psychiatric:         Mood and Affect: Mood normal.         Behavior: Behavior normal.             Lab Results   Component Value Date     02/11/2021    K 3.5 02/11/2021    CL 98 (L) 02/11/2021    CO2 27 02/11/2021    BUN 17 02/11/2021    CREATININE 0.7 02/11/2021    ANIONGAP 12 02/11/2021     Lab Results   Component Value Date    HGBA1C 5.1 04/29/2019     No results found for: "BNP", "BNPTRIAGEBLO"    Lab Results   Component Value Date    WBC 7.20 02/11/2021    HGB 14.1 02/11/2021    HCT 42.9 02/11/2021     02/11/2021    GRAN 5.1 02/11/2021    GRAN 70.0 02/11/2021     Lab Results   Component Value Date    CHOL 285 (H) 04/29/2019    HDL 71 04/29/2019    LDLCALC 199 (H) 04/29/2019    TRIG 77 04/29/2019          Current Outpatient Medications:     hydroCHLOROthiazide (HYDRODIURIL) 25 MG tablet, Take 25 " mg by mouth once daily., Disp: , Rfl:     multivit with calcium,iron,min (WOMEN'S DAILY MULTIVITAMIN ORAL), Take 1 tablet by mouth once daily., Disp: , Rfl:     omeprazole (PRILOSEC) 10 MG capsule, Take 10 mg by mouth once daily., Disp: , Rfl:     clotrimazole-betamethasone 1-0.05% (LOTRISONE) cream, Apply topically 2 (two) times daily., Disp: 45 g, Rfl: 1    [START ON 7/25/2024] ketoconazole (NIZORAL) 2 % shampoo, Apply topically twice a week., Disp: 120 mL, Rfl: 3    RSVPreF3 antigen-AS01E, PF, (AREXVY) 120 mcg/0.5 mL SusR vaccine, Inject 0.5 mLs into the muscle once. for 1 dose, Disp: 0.5 mL, Rfl: 0    varicella-zoster gE-AS01B, PF, (SHINGRIX, PF,) 50 mcg/0.5 mL injection, Inject 0.5 mLs into the muscle once. for 1 dose, Disp: 1 each, Rfl: 0        Assessment:       1. Encounter to establish care    2. Medication monitoring encounter    3. Encounter for screening for cardiovascular disorders    4. Encounter for long-term (current) use of other medications    5. Breast cancer screening by mammogram    6. Skin lesion    7. History of sleeve gastrectomy    8. Need for vaccination    9. Psoriasis           Plan:       Encounter to establish care  Medication monitoring encounter  -     CBC Auto Differential; Future; Expected date: 07/23/2024  -     Comprehensive Metabolic Panel; Future; Expected date: 07/23/2024    Encounter for screening for cardiovascular disorders  -     Lipid Panel; Future; Expected date: 07/23/2024    Encounter for long-term (current) use of other medications  -     Hemoglobin A1C; Future; Expected date: 07/23/2024    Breast cancer screening by mammogram  -     Mammo Digital Screening Bilat; Future; Expected date: 07/23/2024    Skin lesion  -     Ambulatory referral/consult to Dermatology; Future; Expected date: 07/30/2024  -     clotrimazole-betamethasone 1-0.05% (LOTRISONE) cream; Apply topically 2 (two) times daily.  Dispense: 45 g; Refill: 1  -     LYME DISEASE ANTIBODY BY EIA; Future;  Expected date: 07/23/2024  - With known wooded exposure will check lyme disease. Differential to include possible ring work or granuloma annulare  - Recommend all hypoallergenic products until lesions resolve    History of sleeve gastrectomy  -     Zinc; Future; Expected date: 07/23/2024  -     Vitamin B1; Future; Expected date: 07/23/2024  -     Ferritin; Future; Expected date: 07/23/2024  -     Vitamin B12; Future; Expected date: 07/23/2024  -     Vitamin D; Future; Expected date: 07/23/2024  -     Vitamin A; Future; Expected date: 07/23/2024  -     Vitamin B6; Future; Expected date: 07/23/2024  -     Magnesium; Future; Expected date: 07/23/2024    Need for vaccination  -     varicella-zoster gE-AS01B, PF, (SHINGRIX, PF,) 50 mcg/0.5 mL injection; Inject 0.5 mLs into the muscle once. for 1 dose  Dispense: 1 each; Refill: 0  -     RSVPreF3 antigen-AS01E, PF, (AREXVY) 120 mcg/0.5 mL SusR vaccine; Inject 0.5 mLs into the muscle once. for 1 dose  Dispense: 0.5 mL; Refill: 0    Psoriasis  -     ketoconazole (NIZORAL) 2 % shampoo; Apply topically twice a week.  Dispense: 120 mL; Refill: 3    RTC PRN

## 2024-08-06 ENCOUNTER — HOSPITAL ENCOUNTER (OUTPATIENT)
Dept: RADIOLOGY | Facility: HOSPITAL | Age: 64
Discharge: HOME | End: 2024-08-06
Attending: STUDENT IN AN ORGANIZED HEALTH CARE EDUCATION/TRAINING PROGRAM
Payer: COMMERCIAL

## 2024-08-06 DIAGNOSIS — Z12.31 BREAST CANCER SCREENING BY MAMMOGRAM: ICD-10-CM

## 2024-08-06 PROCEDURE — 77063 BREAST TOMOSYNTHESIS BI: CPT | Mod: TC

## 2024-08-06 PROCEDURE — 77067 SCR MAMMO BI INCL CAD: CPT | Mod: TC

## 2024-08-14 ENCOUNTER — PATIENT MESSAGE (OUTPATIENT)
Dept: PRIMARY CARE CLINIC | Facility: CLINIC | Age: 64
End: 2024-08-14
Payer: COMMERCIAL

## 2024-09-19 ENCOUNTER — PATIENT MESSAGE (OUTPATIENT)
Dept: PRIMARY CARE CLINIC | Facility: CLINIC | Age: 64
End: 2024-09-19
Payer: COMMERCIAL

## (undated) DEVICE — TIP RUMI KOH-EFFICIENT 3.5

## (undated) DEVICE — TIP RUMI GREEN DISPOSABLE6.7MM

## (undated) DEVICE — SYR 10CC LUER LOCK

## (undated) DEVICE — SEE MEDLINE ITEM 157110

## (undated) DEVICE — SEE MEDLINE ITEM 157117

## (undated) DEVICE — JELLY SURGILUBE 5GR

## (undated) DEVICE — TROCAR KII FIOS 11MM X 100MM

## (undated) DEVICE — NDL INSUF ULTRA VERESS 120MM

## (undated) DEVICE — SYR 50ML CATH TIP

## (undated) DEVICE — SEALER LIGASURE LAP 37CM 5MM

## (undated) DEVICE — ELECTRODE ELECSURG LAPSCP SPAT

## (undated) DEVICE — SET TRI-LUMEN FILTERED TUBE

## (undated) DEVICE — DRAPE INCISE IOBAN 2 23X17IN

## (undated) DEVICE — SOL CLEARIFY VISUALIZATION LAP

## (undated) DEVICE — PAD PERI POST REPLACEMNT

## (undated) DEVICE — SEE MEDLINE ITEM 152622

## (undated) DEVICE — SOL STRL WATER INJ 1000ML BG

## (undated) DEVICE — UNDERGLOVES BIOGEL PI SZ 7 LF

## (undated) DEVICE — ELECTRODE REM PLYHSV RETURN 9

## (undated) DEVICE — SOL NS 1000CC

## (undated) DEVICE — TRAY FOLEY 16FR INFECTION CONT

## (undated) DEVICE — ENDOSTITCH POLYSORB 0 ES-9

## (undated) DEVICE — CLOSURE SKIN STERI STRIP 1/2X4

## (undated) DEVICE — PACK LAPAROSCOPY BAPTIST

## (undated) DEVICE — SUT VICRYL+ 27 UR-6 VIOL

## (undated) DEVICE — SUT MCRYL PLUS 4-0 PS2 27IN

## (undated) DEVICE — SEE MEDLINE ITEM 146296

## (undated) DEVICE — TROCAR KII FIOS 5MM X 100MM

## (undated) DEVICE — GLOVE BIOGEL SKINSENSE PI 6.5

## (undated) DEVICE — ENDOSTITCH INSTRUMENT

## (undated) DEVICE — KIT WING PAD POSITIONING

## (undated) DEVICE — BANDAGE ADHESIVE

## (undated) DEVICE — FOOTSWITCH SUCTION IRRIGATION

## (undated) DEVICE — SCISSOR 5MMX35CM DIRECT DRIVE

## (undated) DEVICE — SUT EASE CROSSBOW CLSR SYS

## (undated) DEVICE — SOL 9P NACL IRR PIC IL

## (undated) DEVICE — SPONGE LAP 18X18 PREWASHED

## (undated) DEVICE — DRAPE STERI LONG

## (undated) DEVICE — SET CYSTO IRRIGATION UNIV SPIK